# Patient Record
Sex: MALE | Race: WHITE | Employment: FULL TIME | URBAN - METROPOLITAN AREA
[De-identification: names, ages, dates, MRNs, and addresses within clinical notes are randomized per-mention and may not be internally consistent; named-entity substitution may affect disease eponyms.]

---

## 2017-08-21 ENCOUNTER — OFFICE VISIT (OUTPATIENT)
Dept: FAMILY MEDICINE CLINIC | Age: 47
End: 2017-08-21

## 2017-08-21 VITALS
SYSTOLIC BLOOD PRESSURE: 118 MMHG | WEIGHT: 230.6 LBS | TEMPERATURE: 97.5 F | BODY MASS INDEX: 30.56 KG/M2 | HEART RATE: 71 BPM | HEIGHT: 73 IN | OXYGEN SATURATION: 99 % | RESPIRATION RATE: 18 BRPM | DIASTOLIC BLOOD PRESSURE: 86 MMHG

## 2017-08-21 DIAGNOSIS — R20.2 PARESTHESIA OF BOTH HANDS: ICD-10-CM

## 2017-08-21 DIAGNOSIS — Z13.0 SCREENING, ANEMIA, DEFICIENCY, IRON: ICD-10-CM

## 2017-08-21 DIAGNOSIS — Z13.29 SCREENING FOR THYROID DISORDER: ICD-10-CM

## 2017-08-21 DIAGNOSIS — R53.83 FATIGUE, UNSPECIFIED TYPE: ICD-10-CM

## 2017-08-21 DIAGNOSIS — Z13.1 SCREENING FOR DIABETES MELLITUS: ICD-10-CM

## 2017-08-21 DIAGNOSIS — Z13.21 ENCOUNTER FOR VITAMIN DEFICIENCY SCREENING: ICD-10-CM

## 2017-08-21 DIAGNOSIS — Z72.820 POOR SLEEP: ICD-10-CM

## 2017-08-21 DIAGNOSIS — Z13.220 SCREENING, LIPID: ICD-10-CM

## 2017-08-21 DIAGNOSIS — Z00.00 ROUTINE HEALTH MAINTENANCE: ICD-10-CM

## 2017-08-21 DIAGNOSIS — K21.9 GASTROESOPHAGEAL REFLUX DISEASE, ESOPHAGITIS PRESENCE NOT SPECIFIED: ICD-10-CM

## 2017-08-21 DIAGNOSIS — G24.5 BLEPHAROSPASM: ICD-10-CM

## 2017-08-21 DIAGNOSIS — Z00.00 ROUTINE HEALTH MAINTENANCE: Primary | ICD-10-CM

## 2017-08-21 DIAGNOSIS — B35.1 ONYCHOMYCOSIS: ICD-10-CM

## 2017-08-21 RX ORDER — OMEPRAZOLE 40 MG/1
40 CAPSULE, DELAYED RELEASE ORAL DAILY
Qty: 90 CAP | Refills: 3 | Status: SHIPPED | OUTPATIENT
Start: 2017-08-21 | End: 2018-01-05 | Stop reason: SDUPTHER

## 2017-08-21 RX ORDER — CICLOPIROX 80 MG/ML
SOLUTION TOPICAL
Qty: 6.6 ML | Refills: 5 | Status: SHIPPED | OUTPATIENT
Start: 2017-08-21 | End: 2018-01-05 | Stop reason: SDUPTHER

## 2017-08-21 RX ORDER — OMEPRAZOLE 40 MG/1
40 CAPSULE, DELAYED RELEASE ORAL DAILY
COMMUNITY
End: 2017-08-21 | Stop reason: SDUPTHER

## 2017-08-21 NOTE — PROGRESS NOTES
[employer]  Primary Care Office Visit - Complete Physical    Isaac Farias is a 51 y.o. male presenting for annual physical.  : 1970     Assessment/Plan:       ICD-10-CM   1. Routine health maintenance Z00.00   2. Screening for thyroid disorder Z13.29   3. Screening for diabetes mellitus Z13.1   4. Screening, anemia, deficiency, iron Z13.0   5. Encounter for vitamin deficiency screening Z13.21   6. Screening, lipid  Labs ordered accordingly     Z13.220   7. Fatigue, unspecified type - initial encounter R53.83   8. Poor sleep - initial encounter  Will check labs, however with history, likely would benefit from sleep study     Z72.820   9. Blepharospasm, left - initial encounter  Likely 2/2 #7-8, and stress  Reassurance given     G24.5   10. Onychomycosis - initial encounter  penlac     B35.1   11.  Gastroesophageal reflux disease, esophagitis presence not specified - ongoing, followed by GI     K21.9   12. Paresthesia of both hands - initial encounter  Likely carpal tunnel, thus will start with HEP     R20.2       Orders Placed This Encounter    VITAMIN B12 & FOLATE    HEMOGLOBIN A1C WITH EAG     Standing Status:   Future     Number of Occurrences:   1     Standing Expiration Date:   2018    TSH 3RD GENERATION     Standing Status:   Future     Number of Occurrences:   1     Standing Expiration Date:   2018    T4, FREE     Standing Status:   Future     Number of Occurrences:   1     Standing Expiration Date:   2018    VITAMIN D, 25 HYDROXY     Standing Status:   Future     Number of Occurrences:   1     Standing Expiration Date:       METABOLIC PANEL, COMPREHENSIVE     Standing Status:   Future     Number of Occurrences:   1     Standing Expiration Date:   2018    LIPID PANEL     Standing Status:   Future     Number of Occurrences:   1     Standing Expiration Date:   2018    CBC WITH AUTOMATED DIFF     Standing Status:   Future     Number of Occurrences:   1     Standing Expiration Date:   8/22/2018    FERRITIN     Standing Status:   Future     Number of Occurrences:   1     Standing Expiration Date:   8/22/2018    IRON PROFILE     Standing Status:   Future     Number of Occurrences:   1     Standing Expiration Date:   8/22/2018    omeprazole (PRILOSEC) 40 mg capsule     Sig: Take 1 Cap by mouth daily. Dispense:  90 Cap     Refill:  3    ciclopirox (PENLAC) 8 % solution     Sig: Apply to adjacent skin and affected nails daily. Remove with alcohol every 7 days. Dispense:  6.6 mL     Refill:  5       Management plan & patient instructions reviewed with Isaac Cabrera, who voiced understanding. This document may have been created with the aid of dictation software. Text may contain errors, particularly phonetic errors. Stephy Bridges MD  Internal Medicine, Family Medicine & Sports Medicine  8/27/2017, 1:24 PM    Patient Instructions (provided in AVS): To Do:  · return to the office at your convenience for FASTING (nothing to eat/drink 8-10 hours before, except water) bloodwork; lab opens @ 7am M-F      Notes from your doctor:  · I am going to check for some hormonal or vitamin causes of your insomnia and your spasms; if they are normal, I think we should consider sending you to a sleep specialist for a possible sleep study  ·   Tic en el párpado: Instrucciones de cuidado - [ Eyelid Twitch: Care Instructions ]  Síndrome del túnel carpiano: Instrucciones de cuidado - [ Carpal Tunnel Syndrome: Care Instructions ]  Síndrome del túnel carpiano: Ejercicios - [ Carpal Tunnel Syndrome: Exercises ]  Visita de control para personas de 18 a 48 años: Instrucciones de cuidado - [ Well Visit, Ages 25 to 48: Care Instructions ]    History:   Isaac Cabrera is a 51 y.o. male presenting for an annual physical.    Social History     Social History Narrative    Originally from [facility]. 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     Lived in the [address] since 2011.    (8/2017)       Has not been sleeping well.  + left sided eyelid twitch. Wife notes that he snores, and he also has to move his legs a lot. Wakes 3-4x/night to urinate, but possibly just because he is already awake at that time (rather than the need to urinate waking him from sleep). Reports r > l hand paresthesias. Is ambidextrous    Has occasional neck pain  Has completed formal PT in the past for LBP. Has been many years since his last physical.    Past Medical History:   Diagnosis Date    Acid reflux      Past Surgical History:   Procedure Laterality Date    HX HEMORRHOIDECTOMY  2005    HX HERNIA REPAIR Right 1982    HX VASECTOMY        reports that he has been smoking Cigars. He has never used smokeless tobacco. He reports that he drinks alcohol. He reports that he does not use illicit drugs. History   Smoking Status    Current Some Day Smoker    Types: Cigars   Smokeless Tobacco    Never Used     Family History   Problem Relation Age of Onset    Cancer Mother      colon    Cancer Father      prostate     No Known Allergies    Problem List:    There are no active problems to display for this patient. Medications:     Current Outpatient Prescriptions   Medication Sig    omeprazole (PRILOSEC) 40 mg capsule Take 1 Cap by mouth daily. No current facility-administered medications for this visit.         Review of Systems:     (positives in bold)   Constitutional: fatigue, weight change, appetite change, fevers, chills   Eyes: itchy eyes, runny eyes, red eyes, eye discharge, vision changes, light sensitivity   Neuro: headaches, vision changes, dizziness, loss of consciousness, burning pain, tingling, numbness   ENT: ear pain, ear pressure, ear popping, ear discharge/drainage, hearing change,nosebleeds, sneezing, runny nose, nasal congestion,  change in sense of smell, sore throat, voice change or hoarse voice,   dry mouth, toothache, jaw popping, difficulty swallowing, painful swallowing,   oral ulcers or canker sores   Cardiovascular: chest pain, palpitations, orthopnea, PND   Respiratory: dyspnea, wheezing, cough, sputum production, hemoptysis   GI: nausea, vomiting, heartburn, abdominal pain, greasy stools,   blood in stool, diarrhea, constipation   : dysuria, hematuria, change in urine, urinary frequency, urinary urgency   MSK: muscle/joint pain, joint swelling   Derm: rashes, skin changes   Allergy/Imm: seasonal allergies, itchy eyes   Endocrine: Polyuria, polydipsia, polyphagia, heat intolerance, cold intolerance   Heme/lymph: easy bleeding/bruising   Psych: Suicidal ideation, homicidal ideation, insomnia         Physical Assessment:   VS:    Visit Vitals    /86 (BP 1 Location: Left arm, BP Patient Position: Sitting)    Pulse 71    Temp 97.5 °F (36.4 °C) (Oral)    Resp 18    Ht 6' 1\" (1.854 m)    Wt 230 lb 9.6 oz (104.6 kg)    SpO2 99%    BMI 30.42 kg/m2       General:     Well-developed, well-nourished mildly overweight male, in NAD    Head:      PERRL, EOMI.  MMM, good dentition, oropharynx otherwise WNL. No facial asymmetry noted. Ears:    Bilateral TMs wnl. Bilateral EACs wnl. Neck:      Neck supple, no thyromegaly  Cardiovasc:     No JVD. RRR, no MRG. Pulses 2+ and symmetric at distal extremities. Pulmonary:     Lungs clear bilaterally. Normal respiratory effort. Abdomen:     Abdomen soft, NT, ND, NAB. No masses or organomegaly. Extremities:     No edema, no TTP bilateral calves. No joint effusions. LEs warm and well-perfused. Neuro:     Alert and oriented, CNs II-XII intact, no focal deficits. + mildly positive B carpal tunnel tinel  Skin:      + onychomycosis B great toes  Psych:    pleasant, and conversant. Affect, mood & judgment appropriate.

## 2017-08-21 NOTE — MR AVS SNAPSHOT
Visit Information Zuleyma Mendieta Personal Médico Departamento Teléfono del Dep. Número de visita 8/21/2017  1:30 PM Jazzywes TorresWilman [phone] [id number] Follow-up Instructions Return in about 6 months (around 2/21/2018) for 30min follow-up ;Bulgarian. Upcoming Health Maintenance Date Due DTaP/Tdap/Td series (1 - Tdap) 11/28/1990 INFLUENZA AGE 9 TO ADULT 8/1/2017 Alergias  Review Complete El: 8/21/2017 Por: Deedee Torres MD  
 A partir del:  8/21/2017 No Known Allergies Vacunas actuales Arland Jobs No hay ninguna vacuna archivada. No revisadas esta visita You Were Diagnosed With   
  
 [facility] Routine health maintenance    -  Primary ICD-10-CM: Z00.00 ICD-9-CM: V70.0 Onychomycosis     ICD-10-CM: B35.1 ICD-9-CM: 110.1 Gastroesophageal reflux disease, esophagitis presence not specified     ICD-10-CM: K21.9 ICD-9-CM: 530.81 Fatigue, unspecified type     ICD-10-CM: R53.83 ICD-9-CM: 780.79 Blepharospasm     ICD-10-CM: G24.5 ICD-9-CM: 333.81 Screening for thyroid disorder     ICD-10-CM: Z13.29 ICD-9-CM: V77.0 Screening for diabetes mellitus     ICD-10-CM: Z13.1 ICD-9-CM: V77.1 Screening, lipid     ICD-10-CM: Z63.062 ICD-9-CM: V77.91 Screening, anemia, deficiency, iron     ICD-10-CM: Z13.0 ICD-9-CM: V78.0 Encounter for vitamin deficiency screening     ICD-10-CM: Z13.21 ICD-9-CM: V77.99 Paresthesia of both hands     ICD-10-CM: R20.2 ICD-9-CM: 782.0 Partes vitales PS Pulso Temperatura Resp [place] ( percentil de crecimiento) Peso (percentil de crecimiento)  
 118/86 (BP 1 Location: Left arm, BP Patient Position: Sitting) 71 97.5 °F (36.4 °C) (Oral) 18 6' 1\" (1.854 m) 230 lb 9.6 oz (104.6 kg) SpO2 BMI ([facility]) Estatus de tabaquísmo 99% 30.42 kg/m2 Current Some Day Smoker Historial de signos vitales BMI and BSA Data Body Mass Index Body Surface Area  
 30.42 kg/m 2 2.32 m 2 [facility] Phone [facility] [facility] [phone] Chu lista de medicamentos actualizada Lista actualizada el: 8/21/17  2:23 PM.  Warden Homans use chu lista de medicamentos más reciente. ciclopirox 8 % solution También conocido sandra:  Tommye Se Apply to adjacent skin and affected nails daily. Remove with alcohol every 7 days. omeprazole 40 mg capsule También conocido sandra:  [address] Take 1 Cap by mouth daily. Recetas Enviado a la [place] Refills  
 omeprazole (PRILOSEC) 40 mg capsule 3 Sig: Take 1 Cap by mouth daily. Class: Normal  
 Pharmacy: [address] [place] [address] Ph #: [phone] Route: Oral  
 ciclopirox (PENLAC) 8 % solution 5 Sig: Apply to adjacent skin and affected nails daily. Remove with alcohol every 7 days. Class: Normal  
 Pharmacy: [address] [place] [address] Ph #: [phone] Hicimos lo siguiente VITAMIN B12 & FOLATE [75528 CPT(R)] Instrucciones de seguimiento Return in about 6 months (around 2/21/2018) for 30min follow-up ;Luxembourgish. Por hacer 08/21/2017 Lab:  CBC WITH AUTOMATED DIFF   
  
 08/21/2017 Lab:  FERRITIN   
  
 08/21/2017 Lab:  HEMOGLOBIN A1C WITH EAG   
  
 08/21/2017 Lab:  IRON PROFILE   
  
 08/21/2017 Lab:  LIPID PANEL   
  
 08/21/2017 Lab:  METABOLIC PANEL, COMPREHENSIVE   
  
 08/21/2017 Lab:  T4, FREE   
  
 08/21/2017 Lab:  TSH 3RD GENERATION   
  
 08/21/2017 Lab:  VITAMIN D, 25 HYDROXY Instrucciones para el Paciente To Do: 
· return to the office at your convenience for FASTING (nothing to eat/drink 8-10 hours before, except water) bloodwork; lab opens @ 7am M-F Notes from your doctor: · I am going to check for some hormonal or vitamin causes of your insomnia and your spasms; if they are normal, I think we should consider sending you to a sleep specialist for a possible sleep study Tic en el párpado: Instrucciones de cuidado - [ Eyelid Twitch: Care Instructions ] Instrucciones de cuidado Un tic en el párpado (blefaroespasmo) es un espasmo muscular en el párpado que no se puede controlar. En algunos casos, el párpado se naomi (o no llega a cerrarse) y luego se vuelve a abrir. Podría tener problemas con zahraa o ambos ojos. Además, podría ser sensible a la barry intensa. Los músculos de los párpados podrían tener raj contracción debido a fatiga o estrés. Dion bebidas con cafeína también puede causar tics en el párpado. Estas contracciones podrían molestarlo de manera intermitente marv varios días. Dary tipo de tic en el párpado es común y podría ser El dorado. A menudo, el tic en el párpado desaparece mientras duerme y comienza nuevamente al despertar. La mayoría de las personas ni siquiera notan cuando la contracción se detiene. Es posible que ferreira médico no pueda encontrar la causa de ferreira tic en los párpados. Si los tics son muy intensos, podría pensar en recibir inyecciones de toxina botulínica (Botox). La atención de seguimiento es raj parte clave de ferreira tratamiento y seguridad. Asegúrese de hacer y acudir a todas las citas, y llame a ferreira médico si está teniendo problemas. También es raj buena idea saber los resultados de los exámenes y mantener raj lista de los medicamentos que brigitte. Cómo puede cuidarse en el hogar? · PPS, ya que podría ayudarle a [employer] tics en el párpado. · Consuma menos cafeína. Ésta puede causar espasmos musculares en los ojos. · Utilice gotas para los ojos para mantener la [employer] ojos. Cuándo debe pedir ayuda?  
Preste especial atención a los cambios en ferreira garima y asegúrese de comunicarse con ferreira médico si: 
 · La contracción espasmódica de los párpados dura más de 1 semana. · Comienza a tener contracciones en otras partes de la drew. · Tiene enrojecimiento o hinchazón del dinora. · El dinora supura líquidos. · El párpado se naomi por completo. · No mejora sandra se esperaba. Dónde puede encontrar más información en inglés? Sharlene Greer a [email or web address] Ashlyn Gaytan R820 en la búsqueda para aprender más acerca de \"Tic en el párpado: Instrucciones de cuidado - [ Eyelid Twitch: Care Instructions ]. \" 
Revisado: 20 marzo, 2017 Versión del contenido: 11.3 © 6530-8388 Healthwise, Incorporated. Las instrucciones de cuidado fueron adaptadas bajo licencia por Good Help Connections (which disclaims liability or warranty for this information). Si usted tiene [place] afección médica o sobre estas instrucciones, siempre pregunte a ferreira profesional de garima. Healthwise, Incorporated niega toda garantía o responsabilidad por ferreira uso de esta información. Síndrome del túnel carpiano: Instrucciones de cuidado - [ Carpal Tunnel Syndrome: Care Instructions ] Instrucciones de cuidado El síndrome del túnel carpiano es un problema nervioso. Puede causar hormigueo, entumecimiento, debilidad o [employer] dedos, el pulgar y la [place]. El nervio mediano y varios tejidos fibrosos, llamados tendones, atraviesan la neymar por un espacio denominado túnel carpiano. El movimiento repetido de las louis al trabajar o practicar ciertos pasatiempos y deportes puede hacer presión sobre el nervio. El embarazo y ciertas afecciones médicas, sandra la diabetes, la artritis y [employer] tiroides hipoactiva, también pueden causar el síndrome del [facility]. Para reducir los síntomas, usted podría limitar [facility] o [facility]. También puede marly otras medidas para sentirse mejor.  Si los síntomas son leves, es probable que pueda aliviar el dolor con 1 o 2 semanas de tratamiento en el [facility]. La cirugía es necesaria solo si otros tratamientos no funcionan. La atención de seguimiento es raj parte clave de ferreira tratamiento y seguridad. Asegúrese de hacer y acudir a todas las citas, y llame a ferreira médico si está teniendo problemas. También es raj buena idea saber los resultados de rosalee exámenes y mantener raj lista de los medicamentos que brigitte. Cómo puede cuidarse en el hogar? · Si es posible, interrumpa o disminuya la actividad que causa los síntomas. Si no puede suspenderla, cristy pausas frecuentes para descansar y estirarse o cambie la posición de las louis para hacer raj tarea. Trate de [facility], sandra cuando [place] el ratón de raj computadora. · Trate de evitar doblar o rotar las muñecas. · Pregúntele a ferreira médico si puede marly un analgésico (medicamento para el dolor) de venta jacquie, sandra acetaminofén (Tylenol), ibuprofeno (Advil, Motrin) o naproxeno (Aleve). Sea akila con los medicamentos. Jennifer y siga todas las instrucciones de la [place]. · Si ferreira médico le receta corticosteroides para ayudar a aliviar el dolor y reducir la hinchazón, tómelos exactamente sandra le fueron recetados. Llame a ferreira médico si ran estar teniendo problemas con ferreira medicamento. · Colóquese hielo o raj compresa fría sobre la [place] de 10 a 20 minutos cada vez para aliviar el dolor. Póngase un paño roldan entre el hielo y la piel. · Si ferreira médico o ferreira fisioterapeuta o terapeuta ocupacional le indica que use raj tablilla (férula) para la [place], [place] según las indicaciones para mantener la [place] en raj posición neutra. [place] también reduce la presión sobre ferreira nervio mediano. · Pregúntele a ferreira médico si debería hacer sesiones de fisioterapia o de terapia ocupacional para aprender a hacer las tareas de [employer]. · Mosby clases de yoga para estirar los músculos y fortalecer las louis y las [place]. El yoga ha [employer] síntomas del bryan [facility]. Cómo prevenir el síndrome del túnel carpiano · Cuando trabaje en la computadora, [employer] y las muñecas alineadas con los antebrazos. Mantenga los codos cerca de rosalee costados. Richton un descanso con raj frecuencia de 10 a 15 minutos. · Trate de hacer los siguientes ejercicios: 
¨ Calentamiento: Gire la neymar hacia arriba, [place] y de un lado a otro. Repita esto 4 veces. Estire [employer] dedos, relájelos y vuelva a estirarlos. Repita 4 veces. Estire el pulgar doblándolo levemente hacia atrás, manténgalo en estelle posición y relájelo. Repita 4 veces. ¨ Estiramiento con los [employer] en posición de orar: Comience colocando las gabriella de las louis juntas barbara del pecho, nahid debajo del [place] falls. Baje las louis lentamente hacia la línea de la cintura y manténgalas cerca del estómago con las gabriella juntas hasta que sienta un estiramiento leve a moderado debajo de los antebrazos. Mantenga la posición entre 10 y 21 segundos. Repita 4 veces. ¨ Estiramiento del flexor de la neymar: Extienda el brazo frente a usted, con la lind [place] arriba. Doble la neymar y apunte con la mano hacia el piso. Con la [employer], doble con suavidad la neymar aún más hasta que sienta un estiramiento entre leve y moderado en el antebrazo. Mantenga la posición entre 10 y 21 segundos. Repita 4 veces. ¨ Estiramiento del extensor de la neymar: Repita los pasos para el estiramiento del flexor de la neymar roberto comience con la lind extendida [place]. · Apriete raj pelota de goma varias veces al día para mantener ashley las louis y los dedos. · Evite sostener objetos (sandra un libro) en la misma posición marv mucho tiempo. Siempre que sea posible, utilice toda la mano para marly un objeto. Si [place] solo el pulgar y el dedo índice puede tensionar la [place]. · No fume. Puede empeorar esta afección ya que reduce el flujo de maynor hacia el nervio El paso.  Si necesita ayuda para dejar de fumar, hable con ferreira médico sobre programas y medicamentos para dejar de fumar. Estos pueden aumentar rosalee probabilidades de dejar el hábito para siempre. Cuándo debe pedir ayuda? Preste especial atención a los cambios de ferreira garima y asegúrese de comunicarse con ferreira médico si: 
· El dolor u otros problemas no mejoran con los cuidados en el hogar. · Desea más información sobre la fisioterapia o la terapia ocupacional. 
· Tiene efectos secundarios producidos por los corticosteroides, tales sandra: ¨ Aumento de [place]. ¨ Cambios en el estado de ánimo. ¨ Problemas para dormir. ¨ Fácil formación de moretones. · Tiene otros problemas con los medicamentos. Dónde puede encontrar más información en inglés? Margarito Comer a [email or web address] Zahida Spotted R432 en la búsqueda para aprender más acerca de \"Síndrome del túnel candice: Instrucciones de cuidado - [ Carpal Tunnel Syndrome: Care Instructions ]. \" 
Revisado: 21 marzo, 2017 Versión del contenido: 11.3 © 1082-0767 Healthwise, Incorporated. Las instrucciones de cuidado fueron adaptadas bajo licencia por Good Help Connections (which disclaims liability or warranty for this information). Si usted tiene [place] afección médica o sobre estas instrucciones, siempre pregunte a ferreira profesional de garima. Healthwise, Incorporated niega toda garantía o responsabilidad por ferreira uso de esta información. Síndrome del túnel candice: Ejercicios - [ Carpal Tunnel Syndrome: Exercises ] Instrucciones de cuidado Aquí se presentan algunos ejemplos de ejercicios típicos de rehabilitación para tratar ferreira afección. Empiece cada ejercicio lentamente. Reduzca la intensidad del ejercicio si Canary Manohar a tener dolor. Ferreira médico o ferreira fisioterapeuta o terapeuta ocupacional le dirá cuándo puede comenzar con estos ejercicios y cuáles funcionarán mejor para usted.  
Cómo hacer los ejercicios  
Nota: Cuando ya no tenga dolor o entumecimiento, puede hacer ejercicios para ayudar a prevenir que vuelva el síndrome del túnel carpiano. No cristy ningún estiramiento o movimiento que sea incómodo o doloroso. Estiramientos de calentamiento · Gire la neymar hacia arriba, Friendly abajo y de un lado a otro. Repita 4 veces. · Estire los dedos separándolos. Relájelos y luego vuelva a estirarlos. Repita 4 veces. · Estire el pulgar doblándolo levemente hacia atrás, manténgalo en estelle posición y relájelo. Repita 4 veces. Estiramiento con los [employer] posición de orar 1. Comience colocando las gabriella de las louis juntas barbara del [place], nahid debajo del [place]. 2. Baje las louis lentamente hacia la línea de la cintura y manténgalas cerca del estómago con las gabriella juntas hasta que sienta un estiramiento leve a moderado debajo de los antebrazos. 3. Sosténgalo por lo menos de 15 a 30 segundos. Repita de 2 a 4 veces. Estiramiento del flexor de la [employer] 2. Extienda el brazo barbara de usted con la lind Friendly arriba. 3. Doble la neymar y apunte con la mano hacia el piso. 4. Con la otra mano, doble con suavidad la neymar aún más hasta que sienta un estiramiento entre leve y moderado en el antebrazo. 5. Sosténgalo por lo menos de 15 a 30 segundos. Repita de 2 a 4 veces. Estiramiento del extensor de la [employer] Repita los pasos del 1 al 4 del estiramiento anterior, roberto comience con la lind de la mano extendida Kjazmin K. La atención de seguimiento es raj parte clave de ferreira tratamiento y seguridad. Asegúrese de hacer y acudir a todas las citas, y llame a ferreira médico si está teniendo problemas. También es raj buena idea saber los resultados de rosalee exámenes y mantener raj lista de los medicamentos que brigitte. Dónde puede encontrar más información en inglés? Ismael Veliz a [email or web address] Silverio Bergeron C759 en la búsqueda para aprender más acerca de \"Síndrome del túnel carpiano: Ejercicios - [ Carpal Tunnel Syndrome: Exercises ]. \" 
Revisado: 21 marzo, 2017 Versión del contenido: 11.3 © 4758-2410 Healthwise, Incorporated. Las instrucciones de cuidado fueron adaptadas bajo licencia por Good Help Connections (which disclaims liability or warranty for this information). Si fernie tiene [place] afección médica o sobre estas instrucciones, siempre pregunte a ferreira profesional de garima. Healthwise, Incorporated niega toda garantía o responsabilidad por ferreira uso de esta información. Visita de control para personas de 18 a 50 años: Instrucciones de cuidado - [ Well Visit, Ages 25 to 48: Care Instructions ] Instrucciones de cuidado Los exámenes físicos pueden ayudarle a mantenerse saludable. Ferreira médico lenz revisado ferreira estado general de garima y podría haberle dado algunos consejos para cuidarse. También podría haberle recomendado otros exámenes. En ferreira hogar, fernie puede ayudar a prevenir enfermedades si come de manera saludable, hace ejercicio con regularidad y sigue otras recomendaciones. La atención de seguimiento es raj parte clave de ferreira tratamiento y seguridad. Asegúrese de hacer y acudir a todas las citas, y llame a ferreira médico si está teniendo problemas. También es raj buena idea saber los resultados de rosalee exámenes y mantener raj lista de los medicamentos que brigitte. Cómo puede cuidarse en el hogar? · Alcance un peso saludable y [place]. [place] disminuirá el riesgo de tener [employer], tales sandra obesidad, diabetes, enfermedad cardíaca y presión arterial firo. · Wendy actividad física por lo menos 30 minutos la mayoría de los días de la [place]. Caminar es raj buena opción. Rosaura Erazo desee hacer otras actividades, sandra correr, nadar, [employer], o jugar al tenis u otros deportes de equipo. Discuta con ferreira médico cualquier cambio que quiera introducir en ferreira programa de ejercicios. · No fume ni permita que otros fumen cerca de usted.  Si necesita ayuda para dejar de fumar, hable con ferreira médico sobre programas y [employer] para dejar de fumar. Pueden aumentar rosalee probabilidades de dejar el hábito para siempre. · Consulte con ferreira médico si presenta factores de riesgo de infecciones de transmisión sexual (STI, por rosalee siglas en inglés). Tener raj msiha romel sexual (que no tenga STI y que no tenga relaciones sexuales con nadie más) es raj buena manera de evitar estas infecciones. · Utilice métodos anticonceptivos si no desea tener hijos en silvestre momento. Consulte con ferreira médico acerca de las opciones disponibles más adecuadas para usted. · Protéjase la piel del exceso de sol. [address] 10 a.m. y las 4 p.m., permanezca a la tram o [place] con prendas de vestir y un sombrero de ala ancha. Use gafas de sol que bloqueen los isis ultravioleta. Póngase un protector solar de amplio espectro (SPF 30 o superior) en la piel expuesta, incluso cuando esté nublado. · Acuda al dentista FATEMEH [facility] veces al año para hacerse chequeos y limpiezas dentales. · En el automóvil, use el cinturón de seguridad. · Katheryn alcohol en forma moderada, o no katheryn nada. [place] significa no más de 2 bebidas al día si es hombre, y no más de 1 al día si es Johnathan. Siga las recomendaciones de ferreira médico acerca de cuándo hacerse determinados exámenes. Estas pruebas pueden detectar problemas a tiempo. Para ambos sexos · Colesterol. Hágase un análisis de la grasa (colesterol) en la maynor después de los 21 años de Bertrand. Ferreira médico le indicará con qué frecuencia debe [employer] análisis según ferreira edad, rosalee antecedentes familiares u otros factores que pueden aumentar el riesgo de enfermedad cardíaca. · Presión arterial. Hágase marly la presión arterial marv raj visita de rutina al médico. Ferreira médico le dirá con qué frecuencia debe revisarse la presión arterial según ferreira edad, rosalee niveles de presión arterial y otros factores. · Visión. Hable con ferreira médico acerca de la frecuencia con que debe hacerse raj prueba de glaucoma. · Diabetes. Pregúntele a chu médico si debería hacerse pruebas para la diabetes. · Cáncer de colon. Hágase raj prueba para el cáncer de colon a los 48 años. Usted podría hacerse raj de las [address] Si tiene menos de [address], podría necesitar hacerse raj prueba antes si tiene factores de [place]. Los factores de riesgo incluyen si ya le santiago extraído un pólipo precanceroso del colon o si alguno de rosalee padres, hermanos o hijos lenz tenido cáncer de colon. [address] · El examen de senos y la [place]. Pregúntele a chu médico cuándo debe hacerse un examen clínico de los senos (mamas) y Alonzo. Los expertos médicos no están de acuerdo en si raj hugh de menos de 48 años de edad debe o no hacerse estos exámenes o con qué frecuencia. Chu médico puede ayudarle a decidir qué es lo adecuado para usted. · La prueba de Papanicolaou y el examen Carrol Sami a hacerse pruebas de Papanicolaou a los 21 años. El Papanicolaou es la mejor manera de detectar el cáncer de sid uterino. Esta prueba suele ser parte del examen pélvico. Pregunte con qué frecuencia debe hacerse esta prueba. · Infecciones de transmisión sexual (STI, por rosalee siglas en inglés). Consulte si debe hacerse pruebas de detección de STI. Puede correr riesgo si tiene [employer] con más de [place] persona, especialmente si rosalee parejas no utilizan condones. Para los hombres · Infecciones de transmisión sexual (STI). Consulte si debe hacerse pruebas de detección de STI. Puede correr riesgo si tiene [employer] con más de [place]rella persona, especialmente si usted no utiliza condón. · Examen para el cáncer testicular. Pregúntele a chu médico si debería hacerse un examen de testículos con regularidad. · Examen de próstata. Hable con chu médico para saber si debe hacerse un análisis de maynor para el cáncer de próstata (prueba del PSA, por rosalee siglas en inglés).  Los expertos difieren en cuanto a si los hombres deben hacerse esta prueba y con qué frecuencia. Algunos especialistas lo recomiendan a las personas mayores de 39 años de origen afroamericano o que tienen un padre o un kay que tuvo cáncer de próstata antes de los 65 [place] mar[place]. Cuándo debe pedir ayuda? Preste especial atención a los cambios en ferreira garima y asegúrese de comunicarse con ferreira médico si tiene problemas o síntomas que le preocupan. Dónde puede encontrar más información en inglés? Valeria Reinoso a [email or web address] Escriba P072 en la búsqueda para aprender más acerca de \"Visita de control para personas de 18 a 50 años: Instrucciones de cuidado - [ Well Visit, Ages 25 to 48: Care Instructions ]. \" 
Revisado: 19 julio, 2016 Versión del contenido: 11.3 © 2004-6928 Healthwise, Incorporated. Las instrucciones de cuidado fueron adaptadas bajo licencia por Good Luminous [employer] Connections (which disclaims liability or warranty for this information). Si usted tiene [place] afección médica o sobre estas instrucciones, siempre pregunte a ferreira profesional de garima. Healthwise, Incorporated niega toda garantía o responsabilidad por ferreira uso de esta información. Introducing [facility]! [facility] introduce portal paciente Olegario . Ahora se puede acceder a partes de ferreira expediente médico, enviar por correo electrónico la oficina de ferreira médico y solicitar renovaciones de medicamentos en línea. En ferreira navegador de Internet , Niranjan Marcos a [email or web address] Wendy clic en el usuario por Margarite [place]? Baldo Mccarty clic aquí en la sesión Kaity Due. Verá la página de registro [place]. Ingrese ferreira código de [facility] ceci y asndra aparece a continuación. Usted no tendrá que [employer] código después de alayna completado el proceso de registro . Si usted no se inscribe antes de la fecha de caducidad , debe solicitar un nuevo código. · MyChart Código de acceso : [id number] Expires: 11/19/2017  2:23 PM 
 
 Ingresa los últimos cuatro dígitos de ferreira Número de Seguro Social ( xxxx ) y fecha de nacimiento ( dd / mm / aaaa ) sandra se indica y wendy clic en Enviar. Usted será llevado a la siguiente página de registro . Crear un ID MyChart . Esta será ferreira ID de inicio de sesión de MyChart y no puede ser [place] , por lo que pensar en raj que es Pearletha Senters y fácil de recordar . Crear raj contraseña MyChart . Usted puede cambiar ferreira contraseña en cualquier momento . Ingrese ferreira Password Reset de preguntas y Alonzo . Descanso se puede utilizar en un momento posterior si usted olvida ferreira contraseña. Introduzca ferreira dirección de correo electrónico . Floral Fass recibirá raj notificación por correo electrónico cuando la nueva información está disponible en MyChart . Kerri Pineda clic en Registrarse. [facility] ruiz y descargar porciones de ferreira expediente médico. 
Wendy clic en el enlace de descarga del menú Resumen para descargar raj copia portátil de ferreira información médica . Si tiene [employer] , por favor visite la sección de preguntas frecuentes del sitio web MyChart . Recuerde, MyChart NO es que se utilizará para las necesidades urgentes. Para emergencias médicas , llame al 911 . Ahora disponible en ferreira iPhone y Android ! Por favor proporcione silvestre resumen de la documentación de cuidado a ferreira próximo proveedor. If you have any questions after today's visit, please call [phone].

## 2017-08-21 NOTE — PROGRESS NOTES
Isaac Odom is a 51 y.o. male (: 1970) presenting to address:    Chief Complaint   Patient presents with    Establish Care    Eye Problem     left twitching     Fatigue       Vitals:    17 1308   BP: 118/86   Pulse: 71   Resp: 18   Temp: 97.5 °F (36.4 °C)   TempSrc: Oral   SpO2: 99%   Weight: 230 lb 9.6 oz (104.6 kg)   Height: 6' 1\" (1.854 m)   PainSc:   0 - No pain       Learning Assessment:     Learning Assessment 2017   PRIMARY LEARNER Patient   HIGHEST LEVEL OF EDUCATION - PRIMARY LEARNER  > 4 YEARS OF COLLEGE   BARRIERS PRIMARY LEARNER LANGUAGE   CO-LEARNER CAREGIVER Yes   CO-LEARNER NAME wife   CO-LEARNER HIGHEST LEVEL OF EDUCATION > 4 YEARS OF COLLEGE   BARRIERS CO-LEARNER LANGUAGE   PRIMARY LANGUAGE Uruguayan   PRIMARY LANGUAGE CO-LEARNER Uruguayan    NEED No   LEARNER PREFERENCE PRIMARY READING   LEARNER PREFERENCE CO-LEARNER VIDEOS   LEARNING SPECIAL TOPICS no   ANSWERED BY self   RELATIONSHIP SELF     Depression Screening:     PHQ over the last two weeks 2017   Little interest or pleasure in doing things Not at all   Feeling down, depressed or hopeless Not at all   Total Score PHQ 2 0     Fall Risk Assessment:     Fall Risk Assessment, last 12 mths 2017   Able to walk? Yes   Fall in past 12 months? No     Abuse Screening:     Abuse Screening Questionnaire 2017   Do you ever feel afraid of your partner? N   Are you in a relationship with someone who physically or mentally threatens you? N   Is it safe for you to go home? Y     Coordination of Care Questionaire:   1. Have you been to the ER, urgent care clinic since your last visit? Hospitalized since your last visit? NO    2. Have you seen or consulted any other health care providers outside of the [address] [place] [address] since your last visit? Include any pap smears or colon screening. NO    Advanced Directive:   1. Do you have an Advanced Directive? NO    2.  Would you like information on Advanced Directives?  NO

## 2017-08-21 NOTE — PATIENT INSTRUCTIONS
To Do:  · return to the office at your convenience for FASTING (nothing to eat/drink 8-10 hours before, except water) bloodwork; lab opens @ 7am M-F      Notes from your doctor:  · I am going to check for some hormonal or vitamin causes of your insomnia and your spasms; if they are normal, I think we should consider sending you to a sleep specialist for a possible sleep study         Tic en el párpado: Instrucciones de cuidado - [ Eyelid Twitch: Care Instructions ]  Instrucciones de cuidado  Un tic en el párpado (blefaroespasmo) es un espasmo muscular en el párpado que no se puede controlar. En algunos casos, el párpado se naomi (o no llega a cerrarse) y luego se vuelve a abrir. Podría tener problemas con zahraa o ambos ojos. Además, podría ser sensible a la barry intensa. Los músculos de los párpados podrían tener raj contracción debido a fatiga o estrés. Dion bebidas con cafeína también puede causar tics en el párpado. Estas contracciones podrían molestarlo de manera intermitente marv varios días. Dary tipo de tic en el párpado es común y podría ser El dorado. A menudo, el tic en el párpado desaparece mientras duerme y comienza nuevamente al despertar. La mayoría de las personas ni siquiera notan cuando la contracción se detiene. Es posible que ferreira médico no pueda encontrar la causa de ferreira tic en los párpados. Si los tics son muy intensos, podría pensar en recibir inyecciones de toxina botulínica (Botox). La atención de seguimiento es raj parte clave de ferreira tratamiento y seguridad. Asegúrese de hacer y acudir a todas las citas, y llame a ferreira médico si está teniendo problemas. También es raj buena idea saber los resultados de los exámenes y mantener raj lista de los medicamentos que brigitte. Cómo puede cuidarse en el hogar? · [employer], ya que podría ayudarle a [employer] tics en el párpado. · Consuma menos cafeína. Ésta puede causar espasmos musculares en los ojos.   · Utilice gotas para los ojos para [employer] la [employer] ojos. Cuándo debe pedir ayuda? Preste especial atención a los cambios en ferreira garima y asegúrese de comunicarse con ferreira médico si:  · La contracción espasmódica de los párpados dura más de 1 semana. · Comienza a tener contracciones en otras partes de la drew. · Tiene enrojecimiento o hinchazón del dinora. · El dinora supura líquidos. · El párpado se naomi por completo. · No mejora sandra se esperaba. Dónde puede encontrar más información en inglés? Tian Francis a http://sumi-ellyn.info/. Wilson March F550 en la búsqueda para aprender más acerca de \"Tic en el párpado: Instrucciones de cuidado - [ Eyelid Twitch: Care Instructions ]. \"  Revisado: 20 marzo, 2017  Versión del contenido: 11.3  © 6071-7431 Healthwise, Incorporated. Las instrucciones de cuidado fueron adaptadas bajo licencia por Good Interview Master Connections (which disclaims liability or warranty for this information). Si usted tiene [place] afección médica o sobre estas instrucciones, siempre pregunte a ferreira profesional de garima. Healthwise, Incorporated niega toda garantía o responsabilidad por efrreira uso de esta información. Síndrome del túnel carpiano: Instrucciones de cuidado - [ Carpal Tunnel Syndrome: Care Instructions ]  Instrucciones de cuidado    El síndrome del túnel carpiano es un problema nervioso. Puede causar hormigueo, entumecimiento, debilidad o [employer] dedos, el pulgar y la [place]. El nervio mediano y varios tejidos fibrosos, llamados tendones, atraviesan la neymar por un espacio denominado túnel carpiano. El movimiento repetido de las lousi al trabajar o practicar ciertos pasatiempos y deportes puede hacer presión sobre el nervio. El embarazo y ciertas afecciones médicas, sandra la diabetes, la artritis y Rhonda Miners tiroides hipoactiva, también pueden causar el síndrome del túnel carpiano. Para reducir los síntomas, usted podría limitar [facility] o [facility] Centerla de [place].  También puede marly [employer] para sentirse mejor. Si los síntomas son leves, es probable que pueda aliviar el dolor con 1 o 2 semanas de tratamiento en el hogar. La cirugía es necesaria solo si otros tratamientos no funcionan. La atención de seguimiento es raj parte clave de ferreira tratamiento y seguridad. Asegúrese de hacer y acudir a todas las citas, y llame a ferreira médico si está teniendo problemas. También es raj buena idea saber los resultados de rosalee exámenes y mantener raj lista de los medicamentos que brigitte. Cómo puede cuidarse en el hogar? · Si es posible, interrumpa o disminuya la actividad que causa los síntomas. Si no puede suspenderla, cristy pausas frecuentes para descansar y estirarse o cambie la posición de las louis para hacer raj tarea. Trate de [facility], sandra cuando [place] el ratón de raj computadora. · Trate de evitar doblar o rotar las muñecas. · Pregúntele a ferreira médico si puede marly un analgésico (medicamento para el dolor) de venta jacquie, sandra acetaminofén (Tylenol), ibuprofeno (Advil, Motrin) o naproxeno (Aleve). Sea akila con los medicamentos. Jennifer y siga todas las instrucciones de la [place]. · Si ferreira médico le receta corticosteroides para ayudar a aliviar el dolor y reducir la hinchazón, tómelos exactamente sandra le fueron recetados. Llame a ferreira médico si ran estar teniendo problemas con ferreira medicamento. · Colóquese hielo o raj compresa fría sobre la [place] de 10 a 20 minutos cada vez para aliviar el dolor. Póngase un paño roldan entre el hielo y la piel. · Si ferreira médico o ferreira fisioterapeuta o terapeuta ocupacional le indica que use raj tablilla (férula) para la [place], [place] según las indicaciones para mantener la [place] en raj posición neutra. [place] también reduce la presión sobre ferreira nervio mediano. · Pregúntele a ferreira médico si debería hacer sesiones de fisioterapia o de terapia ocupacional para aprender a hacer las tareas de [employer].   · [place] clases de yoga para estirar los músculos y fortalecer las louis y las muñecas. El yoga ha [employer] síntomas del bryan harvey. Cómo prevenir el síndrome del bryan harvey  · Cuando trabaje en la computadora, Juany Priest 31 y las muñecas alineadas con los antebrazos. Mantenga los codos cerca de rosalee costados. [place] un descanso con raj frecuencia de 10 a 15 minutos. · Trate de hacer los siguientes ejercicios:  ¨ Calentamiento: Gire la neymar hacia arriba, [employer] y de un lado a otro. Repita esto 4 veces. Estire [employer] dedos, relájelos y vuelva a estirarlos. Repita 4 veces. Estire el pulgar doblándolo levemente hacia atrás, manténgalo en estelle posición y relájelo. Repita 4 veces. ¨ Estiramiento con los [facility] en posición de orar: Comience colocando las gabriella de las louis juntas barbara del pecho, nahid debajo del [place]. Baje las louis lentamente hacia la línea de la cintura y manténgalas cerca del estómago con las gabriella juntas hasta que sienta un estiramiento leve a moderado debajo de los antebrazos. Mantenga la posición entre 10 y 21 segundos. Repita 4 veces. ¨ Estiramiento del flexor de la neymar: Extienda el brazo frente a usted, con la lind [place] arriba. Doble la neymar y apunte con la mano hacia el piso. Con la [employer], doble con suavidad la neymar aún más hasta que sienta un estiramiento entre leve y moderado en el antebrazo. Mantenga la posición entre 10 y 21 segundos. Repita 4 veces. ¨ Estiramiento del extensor de la neymar: Repita los pasos para el estiramiento del flexor de la neymar roberto comience con la lind extendida [employer]. · Apriete raj pelota de goma varias veces al día para mantener ashley las louis y los dedos. · Evite sostener objetos (sandra un libro) en la misma posición marv mucho tiempo. Siempre que sea posible, utilice toda la mano para marly un objeto. Si [place] solo el pulgar y el dedo índice puede tensionar la [place]. · No fume. Puede empeorar esta afección ya que reduce el flujo de maynor hacia el nervio El paso.  Si necesita ayuda para dejar de fumar, hable con ferreira médico sobre programas y medicamentos para dejar de fumar. Estos pueden aumentar rosalee probabilidades de dejar el hábito para siempre. Cuándo debe pedir ayuda? Preste especial atención a los cambios de ferreira garima y asegúrese de comunicarse con ferreira médico si:  · El dolor u otros problemas no mejoran con los cuidados en el hogar. · Desea más información sobre la fisioterapia o la terapia ocupacional.  · Tiene efectos secundarios producidos por los corticosteroides, tales sandra:  ¨ Aumento de [place]. ¨ Cambios en el estado de ánimo. ¨ Problemas para dormir. ¨ Fácil formación de moretones. · Tiene otros problemas con los medicamentos. Dónde puede encontrar más información en inglés? [place] [employer] a http://sumi-ellyn.info/. Elinda Valle R432 en la búsqueda para aprender más acerca de \"Síndrome del túnel carpiano: Instrucciones de cuidado - [ Carpal Tunnel Syndrome: Care Instructions ]. \"  Revisado: 21 marzo, 2017  Versión del contenido: 11.3  © 5068-6639 Healthwise, Incorporated. Las instrucciones de cuidado fueron adaptadas bajo licencia por Good Help Connections (which disclaims liability or warranty for this information). Si usted tiene [place] afección médica o sobre estas instrucciones, siempre pregunte a ferreira profesional de garima. Healthwise, Incorporated niega toda garantía o responsabilidad por ferreira uso de esta información. Síndrome del túnel carpiano: Ejercicios - [ Carpal Tunnel Syndrome: Exercises ]  Instrucciones de cuidado  Aquí se presentan algunos ejemplos de ejercicios típicos de rehabilitación para tratar ferreira afección. Empiece cada ejercicio lentamente. Reduzca la intensidad del ejercicio si Annelle Marrow a tener dolor. Ferreira médico o ferreira fisioterapeuta o terapeuta ocupacional le dirá cuándo puede comenzar con estos ejercicios y cuáles funcionarán mejor para usted.   Cómo hacer los ejercicios   Nota: Cuando ya no tenga dolor o entumecimiento, puede hacer ejercicios para ayudar a prevenir que vuelva el síndrome del túnel carpiano. No cristy ningún estiramiento o movimiento que sea incómodo o doloroso. Estiramientos de calentamiento  · Gire la neymar hacia arriba, Simone Judy y de un lado a otro. Repita 4 veces. · Estire los dedos separándolos. Relájelos y luego vuelva a estirarlos. Repita 4 veces. · Estire el pulgar doblándolo levemente hacia atrás, manténgalo en estelle posición y relájelo. Repita 4 veces. Estiramiento con los brazos en posición de orar    1. Comience colocando las gabriella de las louis juntas barbara del [place], nahid debajo del [place]. 2. Baje las louis lentamente hacia la línea de la cintura y manténgalas cerca del estómago con las gabriella juntas hasta que sienta un estiramiento leve a moderado debajo de los antebrazos. 3. Sosténgalo por lo menos de 15 a 30 segundos. Repita de 2 a 4 veces. Estiramiento del flexor de la neymar    2. Extienda el brazo barbara de usted con la lind [place] arriba. 3. Doble la neymar y apunte con la mano hacia el piso. 4. Con la otra mano, doble con suavidad la neymar aún más hasta que sienta un estiramiento entre leve y moderado en el antebrazo. 5. Sosténgalo por lo menos de 15 a 30 segundos. Repita de 2 a 4 veces. Estiramiento del extensor de la neymar    Repita los pasos del 1 al 4 del estiramiento anterior, roberto comience con la lind de la mano extendida Simone Judy. La atención de seguimiento es raj parte clave de ferreira tratamiento y seguridad. Asegúrese de hacer y acudir a todas las citas, y llame a ferreira médico si está teniendo problemas. También es raj buena idea saber los resultados de rosalee exámenes y mantener raj lista de los medicamentos que brigitte. Dónde puede encontrar más información en inglés? Lorena Prather a http://sumi-ellyn.info/. Abby Alvarez S927 en la búsqueda para aprender más acerca de \"Síndrome del túnel carpiano: Ejercicios - [ Carpal Tunnel Syndrome: Exercises ]. \"  Monique Galdamez: 21 marzo, 2017  Versión del contenido: 11.3  © 0945-0573 Healthwise, Incorporated. Las instrucciones de cuidado fueron adaptadas bajo licencia por Good Help Connections (which disclaims liability or warranty for this information). Si fernie tiene [place] afección médica o sobre estas instrucciones, siempre pregunte a ferreira profesional de garima. Healthwise, Incorporated niega toda garantía o responsabilidad por ferreira uso de esta información. Visita de control para personas de 18 a 50 años: Instrucciones de cuidado - [ Well Visit, Ages 25 to 48: Care Instructions ]  Instrucciones de cuidado  Los exámenes físicos pueden ayudarle a mantenerse saludable. Ferreira médico lenz revisado ferreira estado general de garima y podría haberle dado algunos consejos para cuidarse. También podría haberle recomendado otros exámenes. En ferreira hogar, fernie puede ayudar a prevenir enfermedades si come de manera saludable, hace ejercicio con regularidad y sigue otras recomendaciones. La atención de seguimiento es raj parte clave de ferreira tratamiento y seguridad. Asegúrese de hacer y acudir a todas las citas, y llame a ferreira médico si está teniendo problemas. También es raj buena idea saber los resultados de rosalee exámenes y mantener raj lista de los medicamentos que brigitte. Cómo puede cuidarse en el hogar? · Alcance un peso saludable y [place]. [place] disminuirá el riesgo de tener [employer], tales sandra obesidad, diabetes, enfermedad cardíaca y presión arterial fior. · Wendy actividad física por lo menos 30 minutos la mayoría de los días de la [place]. Caminar es raj buena opción. Marilou Lace desee hacer otras actividades, sandra correr, nadar, [employer], o jugar al tenis u otros deportes de equipo. Discuta con ferreira médico cualquier cambio que quiera introducir en ferreira programa de ejercicios. · No fume ni permita que otros fumen cerca de usted.  Si necesita ayuda para dejar de fumar, hable con ferreira médico sobre programas y medicamentos para dejar de fumar. Pueden aumentar rosalee probabilidades de dejar el hábito para siempre. · Consulte con ferreira médico si presenta factores de riesgo de infecciones de transmisión sexual (STI, por rosalee siglas en inglés). Tener raj misha romel sexual (que no tenga STI y que no tenga relaciones sexuales con nadie más) es raj buena manera de evitar estas infecciones. · Utilice métodos anticonceptivos si no desea tener hijos en silvestre momento. Consulte con ferreira médico acerca de las opciones disponibles más adecuadas para usted. · Protéjase la piel del exceso de sol. [address] 10 a.m. y las 4 p.m., permanezca a la tram o [place] Elise con prendas de vestir y un sombrero de ala ancha. Use gafas de sol que bloqueen los isis ultravioleta. Póngase un protector solar de amplio espectro (SPF 30 o superior) en la piel expuesta, incluso cuando esté nublado. · Acuda al dentista [facility]EMEH [facility] veces al año para hacerse chequeos y limpiezas dentales. · En el automóvil, use el cinturón de seguridad. · Enrique alcohol en forma moderada, o no enrique nada. [place] significa no más de 2 bebidas al día si es hombre, y no más de 1 al día si es [place]. Siga las recomendaciones de ferreira médico acerca de cuándo hacerse determinados exámenes. Estas pruebas pueden detectar problemas a tiempo. Para ambos sexos  · Colesterol. Hágase un análisis de la grasa (colesterol) en la maynor después de los 21 años de [place]. Ferreira médico le indicará con qué frecuencia debe [employer] análisis según ferreira edad, rosalee antecedentes familiares u otros factores que pueden aumentar el riesgo de enfermedad cardíaca. · Presión arterial. Hágase marly la presión arterial marv raj visita de rutina al médico. Ferreira médico le dirá con qué frecuencia debe revisarse la presión arterial según ferreira edad, rosalee niveles de presión arterial y otros factores. · Visión. Hable con ferreira médico acerca de la frecuencia con que debe hacerse raj prueba de glaucoma. · Diabetes.  Pregúntele a ferreira médico si debería hacerse pruebas para la diabetes. · Cáncer de colon. Hágase raj prueba para el cáncer de colon a los 48 años. Usted podría hacerse raj de las [address]. Si tiene menos de [address], podría necesitar hacerse raj prueba antes si tiene factores de [place]. Los factores de riesgo incluyen si ya le santiago extraído un pólipo precanceroso del colon o si alguno de rosalee padres, hermanos o hijos lenz tenido cáncer de colon. Para las mujeres  · El examen de senos y la [place]. Pregúntele a chu médico cuándo debe hacerse un examen clínico de los senos (mamas) y [place]. Los expertos médicos no están de acuerdo en si raj hugh de menos de 48 años de edad debe o no hacerse estos exámenes o con qué frecuencia. Chu médico puede ayudarle a decidir qué es lo adecuado para usted. · La prueba de Papanicolaou y el examen Gifford Richard a hacerse pruebas de Papanicolaou a los 21 años. El Papanicolaou es la mejor manera de detectar el cáncer de sid uterino. Esta prueba suele ser parte del examen pélvico. Pregunte con qué frecuencia debe hacerse esta prueba. · Infecciones de transmisión sexual (STI, por rosalee siglas en inglés). Consulte si debe hacerse pruebas de detección de STI. Puede correr riesgo si tiene [employer] con más de [place] persona, especialmente si rosalee parejas no utilizan condones. Para los hombres  · Infecciones de transmisión sexual (STI). Consulte si debe hacerse pruebas de detección de STI. Puede correr riesgo si tiene [employer] con más de [place] persona, especialmente si usted no utiliza condón. · Examen para el cáncer testicular. Pregúntele a chu médico si debería hacerse un examen de testículos con regularidad. · Examen de próstata. Hable con chu médico para saber si debe hacerse un análisis de maynor para el cáncer de próstata (prueba del PSA, por rosalee siglas en inglés). Los expertos difieren en cuanto a si los hombres deben hacerse esta prueba y con qué frecuencia.  Jaime Jarvis especialistas lo recomiendan a las personas mayores de 39 años de origen afroamericano o que tienen un padre o un kay que tuvo cáncer de próstata antes de los 72 [place]. Cuándo debe pedir ayuda? Preste especial atención a los cambios en ferreira garima y asegúrese de comunicarse con ferreira médico si tiene problemas o síntomas que le preocupan. Dónde puede encontrar más información en inglés? Heather Bowens a http://sumi-ellyn.info/. Escriba P072 en la búsqueda para aprender más acerca de \"Visita de control para personas de 18 a 50 años: Instrucciones de cuidado - [ Well Visit, Ages 25 to 48: Care Instructions ]. \"  Revisado: 19 julio, 2016  Versión del contenido: 11.3  © 9131-8823 Healthwise, Incorporated. Las instrucciones de cuidado fueron adaptadas bajo licencia por Good [facility] Connections (which disclaims liability or warranty for this information). Si usted tiene [place] afección médica o sobre estas instrucciones, siempre pregunte a ferreira profesional de garima. [employer] niega toda garantía o responsabilidad por ferreira uso de esta información.

## 2017-08-27 PROBLEM — R53.83 FATIGUE: Status: ACTIVE | Noted: 2017-08-27

## 2017-08-27 PROBLEM — Z72.820 POOR SLEEP: Status: ACTIVE | Noted: 2017-08-27

## 2017-08-27 PROBLEM — B35.1 ONYCHOMYCOSIS: Status: ACTIVE | Noted: 2017-08-27

## 2017-08-27 PROBLEM — R20.2 PARESTHESIA OF BOTH HANDS: Status: ACTIVE | Noted: 2017-08-27

## 2017-08-27 PROBLEM — K21.9 GASTROESOPHAGEAL REFLUX DISEASE: Status: ACTIVE | Noted: 2017-08-27

## 2017-08-27 PROBLEM — G24.5 BLEPHAROSPASM: Status: ACTIVE | Noted: 2017-08-27

## 2017-12-13 LAB
25(OH)D3 SERPL-MCNC: 22.5 NG/ML (ref 32–100)
A-G RATIO,AGRAT: 1.9 RATIO (ref 1.1–2.6)
ABSOLUTE LYMPHOCYTE COUNT, 10803: 2.2 K/UL (ref 1–4.8)
ALBUMIN SERPL-MCNC: 4.4 G/DL (ref 3.5–5)
ALP SERPL-CCNC: 69 U/L (ref 25–115)
ALT SERPL-CCNC: 43 U/L (ref 5–40)
ANION GAP SERPL CALC-SCNC: 18 MMOL/L
AST SERPL W P-5'-P-CCNC: 23 U/L (ref 10–37)
AVG GLU, 10930: 106 MG/DL (ref 91–123)
BASOPHILS # BLD: 0 K/UL (ref 0–0.2)
BASOPHILS NFR BLD: 1 % (ref 0–2)
BILIRUB SERPL-MCNC: 0.6 MG/DL (ref 0.2–1.2)
BUN SERPL-MCNC: 12 MG/DL (ref 6–22)
CALCIUM SERPL-MCNC: 9.4 MG/DL (ref 8.4–10.4)
CHLORIDE SERPL-SCNC: 103 MMOL/L (ref 98–110)
CHOLEST SERPL-MCNC: 214 MG/DL (ref 110–200)
CO2 SERPL-SCNC: 23 MMOL/L (ref 20–32)
CREAT SERPL-MCNC: 0.9 MG/DL (ref 0.5–1.2)
EOSINOPHIL # BLD: 0.1 K/UL (ref 0–0.5)
EOSINOPHIL NFR BLD: 2 % (ref 0–6)
ERYTHROCYTE [DISTWIDTH] IN BLOOD BY AUTOMATED COUNT: 14.2 % (ref 10–16)
FE % SATURATION,PSAT: 36 % (ref 20–50)
FERRITIN SERPL-MCNC: 485 NG/ML (ref 22–322)
FOLATE,FOL: 11.43 NG/ML
GFRAA, 66117: >60
GFRNA, 66118: >60
GLOBULIN,GLOB: 2.3 G/DL (ref 2–4)
GLUCOSE SERPL-MCNC: 92 MG/DL (ref 70–99)
GRANULOCYTES,GRANS: 54 % (ref 40–75)
HBA1C MFR BLD HPLC: 5.3 % (ref 4.8–5.9)
HCT VFR BLD AUTO: 47.1 % (ref 39.3–51.6)
HDLC SERPL-MCNC: 40 MG/DL (ref 40–59)
HGB BLD-MCNC: 15.1 G/DL (ref 13.1–17.2)
IRON,IRN: 118 MCG/DL (ref 45–160)
LDLC SERPL CALC-MCNC: 136 MG/DL (ref 50–99)
LYMPHOCYTES, LYMLT: 36 % (ref 27–45)
MCH RBC QN AUTO: 29 PG (ref 26–34)
MCHC RBC AUTO-ENTMCNC: 32 G/DL (ref 32–36)
MCV RBC AUTO: 90 FL (ref 80–95)
MONOCYTES # BLD: 0.5 K/UL (ref 0.1–0.9)
MONOCYTES NFR BLD: 8 % (ref 3–9)
NEUTROPHILS # BLD AUTO: 3.2 K/UL (ref 1.8–7.7)
PLATELET # BLD AUTO: 238 K/UL (ref 140–440)
PMV BLD AUTO: 10.7 FL (ref 6–10.8)
POTASSIUM SERPL-SCNC: 4.2 MMOL/L (ref 3.5–5.5)
PROT SERPL-MCNC: 6.7 G/DL (ref 6.4–8.3)
RBC # BLD AUTO: 5.25 M/UL (ref 3.8–5.8)
SODIUM SERPL-SCNC: 144 MMOL/L (ref 133–145)
T4 FREE SERPL-MCNC: 1.5 NG/DL (ref 0.9–1.8)
TIBC,TIBC: 325 MCG/DL (ref 228–428)
TRIGL SERPL-MCNC: 192 MG/DL (ref 40–149)
TSH SERPL DL<=0.005 MIU/L-ACNC: 1.56 MCU/ML (ref 0.27–4.2)
UIBC SERPL-MCNC: 207 MCG/DL (ref 110–370)
VIT B12 SERPL-MCNC: 501 PG/ML (ref 211–911)
VLDLC SERPL CALC-MCNC: 38 MG/DL (ref 8–30)
WBC # BLD AUTO: 5.9 K/UL (ref 4–11)

## 2017-12-18 PROBLEM — E55.9 VITAMIN D DEFICIENCY: Status: ACTIVE | Noted: 2017-12-18

## 2017-12-18 NOTE — PROGRESS NOTES
With smoking, 8.3% 10yr risk, 50% lifetime risk. Rec mod-to-high intensity statin & smoking cessation. Low VitD @ 22.5    Mildly elevated TGs. Elevated ferritin, but normal % iron saturation. Normal CMP, TFT, b12, folate, CBC. Will discuss @ upcoming visit.   1/5/2018   1:00 PM    Fiorella Lowery MD       [address] [place] [address]

## 2018-01-05 ENCOUNTER — OFFICE VISIT (OUTPATIENT)
Dept: FAMILY MEDICINE CLINIC | Age: 48
End: 2018-01-05

## 2018-01-05 VITALS
OXYGEN SATURATION: 96 % | TEMPERATURE: 96.9 F | SYSTOLIC BLOOD PRESSURE: 129 MMHG | HEART RATE: 91 BPM | RESPIRATION RATE: 20 BRPM | HEIGHT: 73 IN | WEIGHT: 240 LBS | BODY MASS INDEX: 31.81 KG/M2 | DIASTOLIC BLOOD PRESSURE: 92 MMHG

## 2018-01-05 DIAGNOSIS — Z80.42 FAMILY HISTORY OF PROSTATE CANCER IN FATHER: ICD-10-CM

## 2018-01-05 DIAGNOSIS — K21.9 GASTROESOPHAGEAL REFLUX DISEASE, ESOPHAGITIS PRESENCE NOT SPECIFIED: ICD-10-CM

## 2018-01-05 DIAGNOSIS — E66.3 OVERWEIGHT: ICD-10-CM

## 2018-01-05 DIAGNOSIS — Z72.820 POOR SLEEP: ICD-10-CM

## 2018-01-05 DIAGNOSIS — E78.5 HYPERLIPIDEMIA, UNSPECIFIED HYPERLIPIDEMIA TYPE: ICD-10-CM

## 2018-01-05 DIAGNOSIS — B35.1 ONYCHOMYCOSIS: ICD-10-CM

## 2018-01-05 DIAGNOSIS — E55.9 VITAMIN D DEFICIENCY: Primary | ICD-10-CM

## 2018-01-05 DIAGNOSIS — Z11.1 SCREENING FOR TUBERCULOSIS: ICD-10-CM

## 2018-01-05 RX ORDER — ERGOCALCIFEROL 1.25 MG/1
50000 CAPSULE ORAL
Qty: 24 CAP | Refills: 0 | Status: SHIPPED | OUTPATIENT
Start: 2018-01-05 | End: 2018-03-28

## 2018-01-05 RX ORDER — OMEPRAZOLE 40 MG/1
40 CAPSULE, DELAYED RELEASE ORAL DAILY
Qty: 90 CAP | Refills: 3 | Status: SHIPPED | OUTPATIENT
Start: 2018-01-05 | End: 2018-09-06 | Stop reason: ALTCHOICE

## 2018-01-05 RX ORDER — CICLOPIROX 80 MG/ML
SOLUTION TOPICAL
Qty: 6.6 ML | Refills: 5 | Status: SHIPPED | OUTPATIENT
Start: 2018-01-05 | End: 2019-08-30 | Stop reason: SDUPTHER

## 2018-01-05 NOTE — PROGRESS NOTES
3 [facility]  Primary Care Office Visit - Problem-Oriented    : 1970   Isaac Voss is a 49 y.o. male presenting for  Chief Complaint   Patient presents with    GERD       Assessment/Plan:       ICD-10-CM   1. Vitamin D deficiency - new diagnosis  - start high dose repletion, followed by 2000 units daily     E55.9   2. Hyperlipidemia, unspecified hyperlipidemia type - new diagnosis  - risk >5%, recs mod-to-high intensity statin, however patient prefers lifestyle changes  - will work on exercise, diet, etc     E78.5   3. Family history of prostate cancer in father (who was dx @ age 79)  - will check PSA     Z80.42   4. Poor sleep - ongoing, intermittent issue, 2/2 anxiety  - prefers to wait on sleep study  - will work on relaxation techniques, given info re: guided meditation     Z72.820   5. Onychomycosis - notes some improvement, but ongoing  - continue penlac     B35.1   6. Gastroesophageal reflux disease, esophagitis presence not specified - well controlled  - no change to current regimen     K21.9   7. Screening for tuberculosis  - PPD placed today, returning on Monday for result     Z11.1   8. Eritrean speaking patient  - [ entire visit conducted in [place] ]    Z78.9     Overweight. I have reviewed/discussed the above normal BMI with the patient. I have recommended the following interventions: dietary management education, guidance, and counseling, encourage exercise and monitor weight.           Orders Placed This Encounter    LIPID PANEL     Standing Status:   Future     Standing Expiration Date:   2019    VITAMIN D, 25 HYDROXY     Standing Status:   Future     Standing Expiration Date:   3/3/3807    METABOLIC PANEL, COMPREHENSIVE     Standing Status:   Future     Standing Expiration Date:   2019    AMB POC TUBERCULOSIS, INTRADERMAL (SKIN TEST)    ergocalciferol (ERGOCALCIFEROL) 50,000 unit capsule     Sig: Take 1 Cap by mouth every Tuesday and Friday for 24 doses. Dispense:  24 Cap     Refill:  0    ciclopirox (PENLAC) 8 % solution     Sig: Apply to adjacent skin and affected nails daily. Remove with alcohol every 7 days. Dispense:  6.6 mL     Refill:  5    omeprazole (PRILOSEC) 40 mg capsule     Sig: Take 1 Cap by mouth daily. Dispense:  90 Cap     Refill:  3         This document may have been created with the aid of dictation software. Text may contain errors, particularly phonetic errors. Reviewed management plan & instructions with patient, who voiced understanding. Leydi Salmeron MD  Internal Medicine, Family Medicine & Sports Medicine  1/5/2018, 1:53 PM    Patient Instructions (provided in AVS): Instrucciones:  · empezar ferreira Vitamina D, y despues de los 3 meses, empezar 2000 units de Vitamin D3 cada chris para mantener ferreira nivel  · regresar en Lunes para ferreira tuberculosis skin test  · regresar en 5 meses para examen de maynor para rechequear ferreira Vitamin D y ferreira colesterol  · \"insight timer\" shanice      History:   Isaac Noble is a 49 y.o. male presenting to address:  Chief Complaint   Patient presents with    GERD     Ongoing sleep problems, but only intermittently. \"I can't turn my brain off, I have been like this all my life. \"  Wife notes only occasional snoring, no apnea. Has ongoing generalized fatigue, but otherwise doing well. Compliant with meds, denies any AE. Past Medical History:   Diagnosis Date    Acid reflux      Past Surgical History:   Procedure Laterality Date    HX HEMORRHOIDECTOMY  2005    HX HERNIA REPAIR Right 1982    HX VASECTOMY        reports that he has been smoking Cigars. He has never used smokeless tobacco. He reports that he drinks alcohol. He reports that he does not use illicit drugs. Social History     Social History Narrative    Originally from [facility]. Runs a [employer].     Lived in the [address] [place] [address],[address] since 2011.    (8/2017)     History   Smoking Status    Current Some Day Smoker    Types: Cigars   Smokeless Tobacco    Never Used     Family History   Problem Relation Age of Onset   Lalo Helm Cancer Mother      colon    Cancer Father      prostate     No Known Allergies    Problem List:      Patient Active Problem List    Diagnosis    Vitamin D deficiency    Poor sleep    Fatigue    Blepharospasm    Onychomycosis    Gastroesophageal reflux disease    Paresthesia of both hands    Italian speaking patient       Medications:     Current Outpatient Prescriptions   Medication Sig    omeprazole (PRILOSEC) 40 mg capsule Take 1 Cap by mouth daily.  ciclopirox (PENLAC) 8 % solution Apply to adjacent skin and affected nails daily. Remove with alcohol every 7 days. No current facility-administered medications for this visit. Review of Systems:     Review of Systems   Constitutional: Positive for malaise/fatigue. Negative for chills and fever. HENT: Negative for ear pain and sore throat. Respiratory: Negative for cough and shortness of breath. Cardiovascular: Negative for chest pain and palpitations. Gastrointestinal: Negative for abdominal pain. Genitourinary: Negative for dysuria. Musculoskeletal: Negative for myalgias. Skin: Negative for rash. Neurological: Negative for speech change, focal weakness and headaches. Endo/Heme/Allergies: Does not bruise/bleed easily. Psychiatric/Behavioral: Negative for depression. The patient is not nervous/anxious and does not have insomnia. Physical Assessment:   VS:    Vitals:    01/05/18 1343   BP: (!) 129/92   Pulse: 91   Resp: 20   Temp: 96.9 °F (36.1 °C)   TempSrc: Oral   SpO2: 96%   Weight: 240 lb (108.9 kg)   Height: 6' 1\" (1.854 m)   PainSc:   0 - No pain       Physical Exam   Constitutional: He is oriented to person, place, and time. He appears well-developed and well-nourished. No distress. + overweight   HENT:   Head: Normocephalic and atraumatic.    Right Ear: External ear normal.   Left Ear: External ear normal.   Mouth/Throat: Oropharynx is clear and moist.   Eyes: EOM are normal. Pupils are equal, round, and reactive to light. Neck: Normal range of motion. Neck supple. Cardiovascular: Normal rate, regular rhythm and normal heart sounds. No murmur heard. Pulmonary/Chest: Effort normal and breath sounds normal. No respiratory distress. He has no wheezes. Abdominal: Soft. Bowel sounds are normal. There is no tenderness. There is no rebound. Musculoskeletal: Normal range of motion. Neurological: He is alert and oriented to person, place, and time. No cranial nerve deficit. Skin: Skin is warm and dry. He is not diaphoretic. Psychiatric: He has a normal mood and affect. His behavior is normal. Judgment and thought content normal.   Nursing note reviewed. Recent Labs & Imaging:     Orders Only on 12/12/2017   Component Date Value Ref Range Status    Triglyceride 12/12/2017 192* 40 - 149 mg/dL Final    HDL Cholesterol 12/12/2017 40  40 - 59 mg/dL Final    Cholesterol, total 12/12/2017 214* 110 - 200 mg/dL Final    LDL, calculated 12/12/2017 136* 50 - 99 mg/dL Final    VLDL, calculated 12/12/2017 38* 8 - 30 mg/dL Final    Comment: Test includes cholesterol, HDL cholesterol, triglycerides and LDL. Cholesterol Recommended NCEP guidelines in mg/dL:  Less than 200      Desirable  200 - 239          Borderline High  Greater than or  = to 240   High      Glucose 12/12/2017 92  70 - 99 mg/dL Final    BUN 12/12/2017 12  6 - 22 mg/dL Final    Creatinine 12/12/2017 0.9  0.5 - 1.2 mg/dL Final    Sodium 12/12/2017 144  133 - 145 mmol/L Final    Potassium 12/12/2017 4.2  3.5 - 5.5 mmol/L Final    Chloride 12/12/2017 103  98 - 110 mmol/L Final    CO2 12/12/2017 23  20 - 32 mmol/L Final    AST (SGOT) 12/12/2017 23  10 - 37 U/L Final    ALT (SGPT) 12/12/2017 43* 5 - 40 U/L Final    Alk.  phosphatase 12/12/2017 69  25 - 115 U/L Final    Bilirubin, total 12/12/2017 0.6  0.2 - 1.2 mg/dL Final    Calcium 12/12/2017 9.4  8.4 - 10.4 mg/dL Final    Protein, total 12/12/2017 6.7  6.4 - 8.3 g/dL Final    Albumin 12/12/2017 4.4  3.5 - 5.0 g/dL Final    A-G Ratio 12/12/2017 1.9  1.1 - 2.6 ratio Final    Globulin 12/12/2017 2.3  2.0 - 4.0 g/dL Final    Anion gap 12/12/2017 18.0  mmol/L Final    Comment: Test includes Albumin, Alkaline Phosphatase, ALT, AST, BUN, Calcium, CO2,  Chloride, Creatinine, Glucose, Potassium, Sodium, Total Bilirubin and Total  Protein. Estimated GFR results are reported in mL/min/1.73 sq.m. by the MDRD equation. This eGFR is validated for stable chronic renal failure patients. This   equation  is unreliable in acute illness or patients with normal renal function.       GFRAA 12/12/2017 >60.0  >60.0 Final    GFRNA 12/12/2017 >60.0  >60.0 Final    Iron 12/12/2017 118  45 - 160 mcg/dL Final    UIBC 12/12/2017 207  110 - 370 mcg/dL Final    TIBC 12/12/2017 325  228 - 428 mcg/dL Final    Iron % saturation 12/12/2017 36  20 - 50 % Final    T4, Free 12/12/2017 1.5  0.9 - 1.8 ng/dL Final    Vitamin B12 12/12/2017 501  211 - 911 pg/mL Final    Folate 12/12/2017 11.43  >=3.10 ng/mL Final    Comment: Borderline deficient  2.2-3.0 ng/ml  Deficient             <2.2 ng/ml      Ferritin 12/12/2017 485* 22 - 322 ng/mL Final    VITAMIN D, 25-HYDROXY 12/12/2017 22.5* 32.0 - 100.0 ng/mL Final    TSH 12/12/2017 1.56  0.27 - 4.20 mcU/mL Final    WBC 12/12/2017 5.9  4.0 - 11.0 K/uL Final    RBC 12/12/2017 5.25  3.80 - 5.80 M/uL Final    HGB 12/12/2017 15.1  13.1 - 17.2 g/dL Final    HCT 12/12/2017 47.1  39.3 - 51.6 % Final    MCV 12/12/2017 90  80 - 95 fL Final    MCH 12/12/2017 29  26 - 34 pg Final    MCHC 12/12/2017 32  32 - 36 g/dL Final    RDW 12/12/2017 14.2  10.0 - 16.0 % Final    PLATELET 21/08/8471 956  140 - 440 K/uL Final    MPV 12/12/2017 10.7  6.0 - 10.8 fL Final    NEUTROPHILS 12/12/2017 54  40 - 75 % Final    Lymphocytes 12/12/2017 36  27 - 45 % Final    MONOCYTES 12/12/2017 8  3 - 9 % Final    EOSINOPHILS 12/12/2017 2  0 - 6 % Final    BASOPHILS 12/12/2017 1  0 - 2 % Final    ABS. NEUTROPHILS 12/12/2017 3.2  1.8 - 7.7 K/uL Final    ABSOLUTE LYMPHOCYTE COUNT 12/12/2017 2.2  1.0 - 4.8 K/uL Final    ABS. MONOCYTES 12/12/2017 0.5  0.1 - 0.9 K/uL Final    ABS. EOSINOPHILS 12/12/2017 0.1  0.0 - 0.5 K/uL Final    ABS.  BASOPHILS 12/12/2017 0.0  0.0 - 0.2 K/uL Final    Hemoglobin A1c 12/12/2017 5.3  4.8 - 5.9 % Final    AVG GLU 12/12/2017 106  91 - 123 mg/dL Final

## 2018-01-05 NOTE — PROGRESS NOTES
Isaac [place] is a 49 y.o. male (: 1970) presenting to address:    Chief Complaint   Patient presents with    GERD       Vitals:    18 1343   BP: (!) 129/92   Pulse: 91   Resp: 20   Temp: 96.9 °F (36.1 °C)   TempSrc: Oral   SpO2: 96%   Weight: 240 lb (108.9 kg)   Height: 6' 1\" (1.854 m)   PainSc:   0 - No pain       Hearing/Vision:   No exam data present    Learning Assessment:     Learning Assessment 2017   PRIMARY LEARNER Patient   HIGHEST LEVEL OF EDUCATION - PRIMARY LEARNER  > 4 YEARS OF COLLEGE   BARRIERS PRIMARY LEARNER LANGUAGE   CO-LEARNER CAREGIVER Yes   CO-LEARNER NAME wife   CO-LEARNER HIGHEST LEVEL OF EDUCATION > 4 YEARS OF COLLEGE   BARRIERS CO-LEARNER LANGUAGE   PRIMARY LANGUAGE Arabic   PRIMARY LANGUAGE CO-LEARNER Arabic    NEED No   LEARNER PREFERENCE PRIMARY READING   LEARNER PREFERENCE CO-LEARNER VIDEOS   LEARNING SPECIAL TOPICS no   ANSWERED BY self   RELATIONSHIP SELF     Depression Screening:     PHQ over the last two weeks 2018   Little interest or pleasure in doing things Not at all   Feeling down, depressed or hopeless Not at all   Total Score PHQ 2 0     Fall Risk Assessment:     Fall Risk Assessment, last 12 mths 2017   Able to walk? Yes   Fall in past 12 months? No     Abuse Screening:     Abuse Screening Questionnaire 2017   Do you ever feel afraid of your partner? N   Are you in a relationship with someone who physically or mentally threatens you? N   Is it safe for you to go home? Y     Coordination of Care Questionaire:   1. Have you been to the ER, urgent care clinic since your last visit? Hospitalized since your last visit? NO    2. Have you seen or consulted any other health care providers outside of the [address] [place] [address] since your last visit? Include any pap smears or colon screening. NO    Advanced Directive:   1. Do you have an Advanced Directive? NO    2. Would you like information on Advanced Directives?  Yes, forms given      Tuberculin skin test applied to Right ventral forearm. Explained how to read the test, measuring induration not just erythema; he will come into office to be read 48-72 hrs after administration.

## 2018-01-05 NOTE — MR AVS SNAPSHOT
Visit Information Daria Muñozrupa Personal Médico Departamento Teléfono del Dep. Número de visita 1/5/2018  1:00 PM [employer] [phone] [id number] Follow-up Instructions Return in about 6 months (around 7/5/2018) for 30min appt - Tongan. Upcoming Health Maintenance Date Due Pneumococcal 19-64 Medium Risk (1 of 1 - PPSV23) 11/28/1988 DTaP/Tdap/Td series (1 - Tdap) 11/28/1990 Influenza Age 5 to Adult 8/1/2017 Alergias  Review Complete El: 1/5/2018 Por: Estefany Avitia MD  
 A partir del:  1/5/2018 No Known Allergies Vacunas actuales Sherial Rings Miguel Kub TB Skin Test (PPD) Intradermal 1/5/2018 No revisadas esta visita You Were Diagnosed With   
  
 John Gibbs Vitamin D deficiency    -  Primary ICD-10-CM: E55.9 ICD-9-CM: 268.9 Hyperlipidemia, unspecified hyperlipidemia type     ICD-10-CM: E78.5 ICD-9-CM: 272.4 Tongan speaking patient     ICD-10-CM: Z78.9 ICD-9-CM: V40.1 Family history of prostate cancer in father     ICD-10-CM: Z80.41 
ICD-9-CM: V16.42 Poor sleep     ICD-10-CM: Z72.820 ICD-9-CM: V69.4 Onychomycosis     ICD-10-CM: B35.1 ICD-9-CM: 110.1 Gastroesophageal reflux disease, esophagitis presence not specified     ICD-10-CM: K21.9 ICD-9-CM: 530.81 Screening for tuberculosis     ICD-10-CM: Z11.1 ICD-9-CM: V74.1 Partes vitales PS Pulso Temperatura Resp [place] ( percentil de crecimiento) Peso (percentil de crecimiento) (!) 129/92 (BP 1 Location: Left arm, BP Patient Position: Sitting) 91 96.9 °F (36.1 °C) (Oral) 20 6' 1\" (1.854 m) 240 lb (108.9 kg) SpO2 BMI (IMC) Estatus de tabaquísmo 96% 31.66 kg/m2 Current Some Day Smoker Historial de signos vitales BMI and BSA Data Body Mass Index Body Surface Area  
 31.66 kg/m 2 2.37 m 2 [facility] Name Phone [facility]/PHARMACY [address] [facility] [phone] Ferreira lista de medicamentos actualizada Lista actualizada el: 1/5/18  2:42 PM.  Amilcar Solo use ferreira lista de medicamentos más reciente. ciclopirox 8 % solution También conocido sandra:  Yon Shouts Apply to adjacent skin and affected nails daily. Remove with alcohol every 7 days. ergocalciferol 50,000 unit capsule También conocido sandra:  ERGOCALCIFEROL Take 1 Cap by mouth every Tuesday and Friday for 24 doses. omeprazole 40 mg capsule También conocido sandra:  [address] Take 1 Cap by mouth daily. Recetas Enviado a la [place] Refills  
 ergocalciferol (ERGOCALCIFEROL) 50,000 unit capsule 0 Sig: Take 1 Cap by mouth every Tuesday and Friday for 24 doses. Class: Normal  
 Pharmacy: [address] [place] [address] Ph #: [phone] Route: Oral  
 ciclopirox (PENLAC) 8 % solution 5 Sig: Apply to adjacent skin and affected nails daily. Remove with alcohol every 7 days. Class: Normal  
 Pharmacy: [address] [place] [address] Ph #: [phone]  
 omeprazole (PRILOSEC) 40 mg capsule 3 Sig: Take 1 Cap by mouth daily. Class: Normal  
 Pharmacy: [address] [place] [address] Ph #: [phone] Route: Oral  
  
Hicimos lo siguiente AMB POC TUBERCULOSIS, INTRADERMAL (SKIN TEST) [63980 CPT(R)] Instrucciones de seguimiento Return in about 6 months (around 7/5/2018) for 30min appt - Pashto. Por hacer 06/05/2018 Lab:  LIPID PANEL   
  
 06/05/2018 Lab:  METABOLIC PANEL, COMPREHENSIVE   
  
 06/05/2018 Lab:  VITAMIN D, 25 HYDROXY Instrucciones para el Paciente Instrucciones: 
· empezar ferreira Vitamina D, y despues de los 3 meses, empezar 2000 units de Vitamin D3 cada chris para mantener ferreira nivel · regresar en Lunes para ferreira tuberculosis skin test 
· regresar en 5 meses para examen de maynor para rechequear ferriera Vitamin D y ferreira colesterol · \"insight timer\" shanice Introducing [facility]! [facility] introduce portal paciente MyChart . Ahora se puede acceder a partes de ferreira expediente médico, enviar por correo electrónico la oficina de ferreira médico y solicitar renovaciones de medicamentos en línea. En ferreira navegador de Internet , Milton Dominguez a [email or web address] Cristy clic en el usuario por Margziyadita Halo? Goran John clic aquí en la sesión [employer]. Verá la página de registro [place]. Ingrese ferreira código de [employer] ceci y sandra aparece a continuación. Usted no tendrá que [employer] código después de alayna completado el proceso de registro . Si usted no se inscribe antes de la fecha de caducidad , debe solicitar un nuevo código. · MyChart Código de acceso : [email or web address] Expires: 4/5/2018  2:42 PM 
 
Ingresa los últimos cuatro dígitos de ferreira Número de Seguro Social ( xxxx ) y fecha de nacimiento ( dd / mm / aaaa ) sandra se indica y cristy clic en Enviar. Usted será llevado a la siguiente página de registro . Crear un ID MyChart . Esta será ferreira ID de inicio de sesión de MyChart y no puede ser [place] , por lo que pensar en raj que es Lelon Hanly y fácil de recordar . Crear raj contraseña MyChart . Usted puede cambiar ferreira contraseña en cualquier momento . Ingrese ferreira Password Reset de preguntas y [place]on[place] . [place] se puede utilizar en un momento posterior si usted olvida ferreira contraseña. Introduzca ferreira dirección de correo electrónico . Elly Leon recibirá raj notificación por correo electrónico cuando la nueva información está disponible en MyChart . T[place]ar [place] clic en Registrarse. [place] Cl[place]s ruiz y descargar porciones de ferreira expediente médico. 
Cristy clic en el enlace de descarga del menú Resumen para descargar raj copia portátil de ferreira información médica . Si tiene [employer] , por favor visite la sección de preguntas frecuentes del sitio web MyChart . Recuerde, MyChart NO es que se utilizará para las necesidades urgentes. Para emergencias médicas , llame al 911 . Ahora disponible en ferreira iPhone y Android ! Por favor proporcione silvestre resumen de la documentación de cuidado a ferreira próximo proveedor. Your primary care clinician is listed as Chaka Irons. If you have any questions after today's visit, please call [phone].

## 2018-01-08 ENCOUNTER — CLINICAL SUPPORT (OUTPATIENT)
Dept: FAMILY MEDICINE CLINIC | Age: 48
End: 2018-01-08

## 2018-01-08 DIAGNOSIS — Z92.29 HISTORY OF BCG VACCINATION: Primary | ICD-10-CM

## 2018-01-08 LAB
MM INDURATION POC: 14 MM (ref 0–5)
PPD POC: NORMAL NEGATIVE

## 2018-01-08 NOTE — PROGRESS NOTES
PPD Reading Note  [facility] Result: 14 mm induration.   Interpretation: NEG

## 2018-04-27 RX ORDER — ERGOCALCIFEROL 1.25 MG/1
CAPSULE ORAL
Qty: 24 CAP | Refills: 0 | Status: SHIPPED | OUTPATIENT
Start: 2018-04-27 | End: 2019-09-13

## 2018-06-05 DIAGNOSIS — E55.9 VITAMIN D DEFICIENCY: ICD-10-CM

## 2018-06-05 DIAGNOSIS — Z80.42 FAMILY HISTORY OF PROSTATE CANCER IN FATHER: ICD-10-CM

## 2018-06-05 DIAGNOSIS — E78.5 HYPERLIPIDEMIA, UNSPECIFIED HYPERLIPIDEMIA TYPE: ICD-10-CM

## 2018-09-06 ENCOUNTER — OFFICE VISIT (OUTPATIENT)
Dept: FAMILY MEDICINE CLINIC | Age: 48
End: 2018-09-06

## 2018-09-06 VITALS
DIASTOLIC BLOOD PRESSURE: 83 MMHG | HEART RATE: 66 BPM | OXYGEN SATURATION: 96 % | HEIGHT: 73 IN | WEIGHT: 235 LBS | RESPIRATION RATE: 18 BRPM | SYSTOLIC BLOOD PRESSURE: 119 MMHG | TEMPERATURE: 97.1 F | BODY MASS INDEX: 31.14 KG/M2

## 2018-09-06 DIAGNOSIS — Z00.00 ROUTINE ADULT HEALTH MAINTENANCE: Primary | ICD-10-CM

## 2018-09-06 DIAGNOSIS — Z78.9 USES SPANISH AS PRIMARY SPOKEN LANGUAGE: ICD-10-CM

## 2018-09-06 DIAGNOSIS — Z72.820 POOR SLEEP: ICD-10-CM

## 2018-09-06 DIAGNOSIS — R53.83 FATIGUE, UNSPECIFIED TYPE: ICD-10-CM

## 2018-09-06 DIAGNOSIS — R06.83 SNORING: ICD-10-CM

## 2018-09-06 DIAGNOSIS — E66.9 CLASS 1 OBESITY WITH BODY MASS INDEX (BMI) OF 31.0 TO 31.9 IN ADULT, UNSPECIFIED OBESITY TYPE, UNSPECIFIED WHETHER SERIOUS COMORBIDITY PRESENT: ICD-10-CM

## 2018-09-06 DIAGNOSIS — Z28.21 INFLUENZA VACCINATION DECLINED: ICD-10-CM

## 2018-09-06 NOTE — PATIENT INSTRUCTIONS
Instrucciones: 
· hacer rosalee examenes de [place] · tratar a controlar ferreira reflujo con Radha Nathan. Si Ud tiene algunas sintomas, Ud puede tratar pepcid o zantac por necesario. · nuestro experimento: usar flonase (raj spray del nariz del estroide) cada noche antes de acostarse para 7-10 rojas. Si ferreira cantidad de ronquidos baja. ... entonces esta es la causa! 
 
 
notas de ferreira doctora: 
· vamos a ruiz rosalee examenes de maynor para ruiz si hay otros razones desde ferreira fatiga (menos de rosalee ronquidos) · voy a anni raj carta por correo con [employer]

## 2018-09-06 NOTE — PROGRESS NOTES
[facility] Primary Care Office Visit - Complete Physical 
 
Isaac Begum is a 49 y.o. male presenting for annual physical. 
: 1970 Assessment/Plan: 1. Routine adult health maintenance Declined influenza. (see below) 2. Poor sleep Ongoing, with associated 3. Fatigue, unspecified type And 
4. Snoring Intermittent. Check labs. Trial of nasal steroid qhs x 1-2 weeks to see if snoring improves. If ongoing, refer for sleep studies. Isaac Kincaid Paola is agreeable. - VITAMIN B12 & FOLATE; Future - FERRITIN; Future 
- IRON PROFILE; Future 
- TSH 3RD GENERATION; Future - T4, FREE; Future 5. Class 1 obesity with body mass index (BMI) of 31.0 to 31.9 in adult, unspecified obesity type, unspecified whether serious comorbidity present I have reviewed/discussed the above normal BMI with the patient. I have recommended the following interventions: dietary management education, guidance, and counseling, encourage exercise and monitor weight. 6. Uses Samoan as primary spoken language [ entire visit conducted in [place] ] 7. Influenza vaccination declined Orders & Diagnoses associated with This Encounter: ICD-10-CM ICD-9-CM 1. Routine adult health maintenance Z00.00 V70.0 2. Class 1 obesity with body mass index (BMI) of 31.0 to 31.9 in adult, unspecified obesity type, unspecified whether serious comorbidity present E66.9 278.00  
 Z68.31 V85.31  
3. Uses Samoan as primary spoken language Z78.9 V40.1 4. Poor sleep Z72.820 V69.4 5. Fatigue, unspecified type R53.83 780.79  
6. Influenza vaccination declined Z28.21 V64.06 Orders Placed This Encounter  VITAMIN B12 & FOLATE Standing Status:   Future Number of Occurrences:   1 Standing Expiration Date:   2019  FERRITIN Standing Status:   Future Number of Occurrences:   1   Standing Expiration Date:   2019  
 IRON PROFILE  
 Standing Status:   Future Number of Occurrences:   1 Standing Expiration Date:   9/7/2019  
 TSH 3RD GENERATION Standing Status:   Future Number of Occurrences:   1 Standing Expiration Date:   9/7/2019  T4, FREE Standing Status:   Future Number of Occurrences:   1 Standing Expiration Date:   9/7/2019 Management plan & patient instructions reviewed with Isaac Sierra, who voiced understanding. This document may have been created with the aid of dictation software. Text may contain errors, particularly phonetic errors. Holli Mayer MD 
Internal Medicine, Family Medicine & Sports Medicine 9/6/2018, 8:27 AM 
 
Patient Instructions (provided in AVS): Instrucciones: 
· hacer rosalee examenes de Aide · tratar a controlar ferreira reflujo con Monalisa Makos. Si Ud tiene algunas sintomas, Ud puede tratar pepcid o zantac por necesario. · nuestro experimento: usar flonase (raj spray del nariz del estroide) cada noche antes de acostarse para 7-10 rojas. Si ferreira cantidad de ronquidos baja. ... entonces esta es la causa! 
 
 
notas de ferreira doctora: 
· vamos a ruiz rosalee examenes de maynor para ruiz si hay otros razones desde ferreira fatiga (menos de rosalee ronquidos) · voy a anni raj carta por correo con [employer] History:  
Isaac Messina is a 49 y.o. male presenting for an annual physical. 
 
blepharospasms stopped. Bilateral shoulder and muscle tension. Thinking it might be 2/2 stress. Stopped omeprazole 2/2 concerns re: AE. 
GERD only an issue with dietary indiscretions. Feels exhausted all the time. \"My body doesn't feel like myself. \" 
Wife admits she is a bit worried. Does still snore. Notes some significant phlegm and rhinorrhea in the mornings, generally worse after snoring night before. Past Medical History:  
Diagnosis Date  Acid reflux Past Surgical History:  
Procedure Laterality Date  HX HEMORRHOIDECTOMY  2005 [address]90  HX VASECTOMY    
 
 reports that he has been smoking Cigars. He has never used smokeless tobacco. He reports that he drinks alcohol. He reports that he does not use illicit drugs. Social History Social History Narrative Originally from [facility]. Runs a [employer]. Lived in the [address] [place] [address],[address] since 2011.  
 (8/2017) History Smoking Status  Current Some Day Smoker  Types: Cigars Smokeless Tobacco  
 Never Used Family History Problem Relation Age of Onset  Cancer Mother   
  colon  Cancer Father 79  
  prostate No Known Allergies Problem List:  
  
Patient Active Problem List  
 Diagnosis  History of BCG vaccination  Family history of prostate cancer in father  Overweight  Vitamin D deficiency  Poor sleep  Fatigue  Blepharospasm  Onychomycosis  Gastroesophageal reflux disease  Paresthesia of both hands  Citizen of [place] speaking patient Medications:  
 
Current Outpatient Prescriptions Medication Sig  
 ciclopirox (PENLAC) 8 % solution Apply to adjacent skin and affected nails daily. Remove with alcohol every 7 days.  omeprazole (PRILOSEC) 40 mg capsule Take 1 Cap by mouth daily. No current facility-administered medications for this visit. Review of Systems:  
 
Review of Systems Constitutional: Positive for malaise/fatigue. Negative for chills and fever. HENT: Positive for congestion. Negative for ear pain and sore throat. Respiratory: Negative for cough and shortness of breath. Cardiovascular: Negative for chest pain and palpitations. Gastrointestinal: Negative for abdominal pain. Genitourinary: Negative for dysuria. Musculoskeletal: Positive for myalgias and neck pain. Skin: Negative for rash. Neurological: Negative for speech change, focal weakness and headaches. Endo/Heme/Allergies: Does not bruise/bleed easily. Psychiatric/Behavioral: Negative for depression. The patient is not nervous/anxious and does not have insomnia. Physical Assessment:  
VS:   
Visit Vitals  /83 (BP 1 Location: Right arm, BP Patient Position: Sitting)  Pulse 66  Temp 97.1 °F (36.2 °C) (Oral)  Resp 18  Ht 6' 1\" (1.854 m)  Wt 235 lb (106.6 kg)  SpO2 96%  BMI 31 kg/m2 Physical Exam  
Constitutional: He is oriented to person, place, and time. He appears well-developed and well-nourished. No distress. + overweight HENT:  
Head: Normocephalic and atraumatic. Right Ear: External ear normal.  
Left Ear: External ear normal.  
Mouth/Throat: Oropharynx is clear and moist.  
Eyes: EOM are normal. Pupils are equal, round, and reactive to light. Neck: Normal range of motion. Neck supple. Cardiovascular: Normal rate, regular rhythm and normal heart sounds. No murmur heard. Pulmonary/Chest: Effort normal and breath sounds normal. No respiratory distress. He has no wheezes. Abdominal: Soft. Bowel sounds are normal. There is no tenderness. There is no rebound. Musculoskeletal: Normal range of motion. Cervical back: He exhibits spasm (B trapezius with TrP with p!). Neurological: He is alert and oriented to person, place, and time. No cranial nerve deficit. Skin: Skin is warm and dry. He is not diaphoretic. Psychiatric: He has a normal mood and affect. His behavior is normal. Judgment and thought content normal.  
Nursing note reviewed.

## 2018-09-06 NOTE — PROGRESS NOTES
Isaac Knight is a 49 y.o. male (: 1970) presenting to address: Chief Complaint Patient presents with  Vitamin D Deficiency  GERD  Fatigue Patient DECLINED flu/TDAP vaccine. Vitals:  
 18 0820 BP: 119/83 Pulse: 66 Resp: 18 Temp: 97.1 °F (36.2 °C) TempSrc: Oral  
SpO2: 96% Weight: 235 lb (106.6 kg) Height: 6' 1\" (1.854 m) PainSc:   0 - No pain Hearing/Vision: No exam data present Learning Assessment:  
 
Learning Assessment 2017 PRIMARY LEARNER Patient HIGHEST LEVEL OF EDUCATION - PRIMARY LEARNER  > 4 YEARS OF COLLEGE  
BARRIERS PRIMARY LEARNER LANGUAGE  
CO-LEARNER CAREGIVER Yes CO-LEARNER NAME wife CO-LEARNER HIGHEST LEVEL OF EDUCATION > 4 YEARS OF COLLEGE  
BARRIERS CO-LEARNER LANGUAGE PRIMARY LANGUAGE Latvian PRIMARY LANGUAGE CO-LEARNER  NEED No  
LEARNER PREFERENCE PRIMARY READING  
LEARNER PREFERENCE CO-LEARNER VIDEOS  
LEARNING SPECIAL TOPICS no  
ANSWERED BY self RELATIONSHIP SELF Depression Screening: PHQ over the last two weeks 2018 Little interest or pleasure in doing things Not at all Feeling down, depressed, irritable, or hopeless Not at all Total Score PHQ 2 0 Fall Risk Assessment:  
 
Fall Risk Assessment, last 12 mths 2017 Able to walk? Yes Fall in past 12 months? No  
 
Abuse Screening:  
 
Abuse Screening Questionnaire 2017 Do you ever feel afraid of your partner? Angela Distel Are you in a relationship with someone who physically or mentally threatens you? Angela Distel Is it safe for you to go home? Gordy Quinones Coordination of Care Questionaire: 1. Have you been to the ER, urgent care clinic since your last visit? Hospitalized since your last visit? NO 
 
2. Have you seen or consulted any other health care providers outside of the [address] [place] [address] since your last visit? Include any pap smears or colon screening. NO Advanced Directive: 1. Do you have an Advanced Directive? NO 
 
2. Would you like information on Advanced Directives?  NO

## 2018-09-06 NOTE — MR AVS SNAPSHOT
[address] [place] [address] [address] [facility] [address] [phone] Patient: Jeremias Voss MRN: [id number] :1970 Visit Information Antony Littlejohn Personal Médico Departamento Teléfono del Dep. Número de visita 2018  8:00 AM Maame Morse, Andrés [facility] [phone] Follow-up Instructions Return in about 6 months (around 3/6/2019) for 30min appt - [place]. Upcoming Health Maintenance Date Due Pneumococcal 19-64 Medium Risk (1 of 1 - PPSV23) 1988 Influenza Age 5 to Adult 3/31/2019* DTaP/Tdap/Td series (1 - Tdap) 2019* *Topic was postponed. The date shown is not the original due date. Alergias  Review Complete El: 2018 Por: Maame Morse MD  
 A partir del:  2018 No Known Allergies Vacunas actuales Abhi Ware Master TB Skin Test (PPD) Intradermal 2018 No revisadas esta visita You Were Diagnosed With   
  
 Jaz Galeano adult health maintenance    -  Primary ICD-10-CM: Z00.00 ICD-9-CM: V70.0 Class 1 obesity with body mass index (BMI) of 31.0 to 31.9 in adult, unspecified obesity type, unspecified whether serious comorbidity present     ICD-10-CM: E66.9, Z68.31 
ICD-9-CM: 278.00, V85.31 Uses [place] Arabian as primary spoken language     ICD-10-CM: Z78.9 ICD-9-CM: V40.1 Poor sleep     ICD-10-CM: Z72.820 ICD-9-CM: V69.4 Fatigue, unspecified type     ICD-10-CM: R53.83 ICD-9-CM: 780.79 Partes vitales PS Pulso Temperatura Resp Beccaria ( percentil de crecimiento) Peso (percentil de crecimiento) 119/83 (BP 1 Location: Right arm, BP Patient Position: Sitting) 66 97.1 °F (36.2 °C) (Oral) 18 6' 1\" (1.854 m) 235 lb (106.6 kg) SpO2 BMI (IMC) Estatus de tabaquísmo 96% 31 kg/m2 Current Some Day Smoker Historial de signos vitales BMI and BSA Data Body Mass Index Body Surface Area  
 31 kg/m 2 2.34 m 2 Tasha Yeh Pharmacy Name Phone [facility] [address] [facility] [phone] Chu lista de medicamentos actualizada Lista actualizada 9/6/18  8:52 AM.  Ellin Loraine use chu lista de medicamentos más reciente. ciclopirox 8 % solution También conocido sandra:  Mil Farrell Apply to adjacent skin and affected nails daily. Remove with alcohol every 7 days. Instrucciones de seguimiento Return in about 6 months (around 3/6/2019) for 30min appt - Belarusian. Por hacer 09/06/2018 Lab:  FERRITIN   
  
 09/06/2018 Lab:  IRON PROFILE   
  
 09/06/2018 Lab:  T4, FREE   
  
 09/06/2018 Lab:  TSH 3RD GENERATION   
  
 09/06/2018 Lab:  VITAMIN B12 & FOLATE Instrucciones para el Paciente Instrucciones: 
· hacer rosalee examenes de [place] · tratar a controlar chu reflujo con Aubery Cambric. Si Ud tiene algunas sintomas, Ud puede tratar pepcid o zantac por necesario. · nuestro experimento: usar flonase (raj spray del nariz del estroide) cada noche antes de acostarse para 7-10 rojas. Si chu cantidad de ronquidos baja. ... entonces esta es la causa! 
 
 
notas de chu doctora: 
· vamos a ruiz rosalee examenes de maynor para ruiz si hay otros razones desde chu fatiga (menos de rsoalee ronquidos) · voy a anni raj carta por correo con [employer] Introducing [facility]! [facility] introduce portal paciente MyChart . Ahora se puede acceder a partes de chu expediente médico, enviar por correo electrónico la oficina de chu médico y solicitar renovaciones de medicamentos en línea. En chu navegador de Internet , Blank Nickels a [email or web address] Wendy emory en el usuario por Vianney Files? Pavel cat aquí en la sesión Kahlil Holley. Verá la página de registro [place]. Ingrese ferreira código de [employer] ceci y sandra aparece a continuación. Usted no tendrá que [employer] código después de alayna completado el proceso de registro . Si usted no se inscribe antes de la fecha de caducidad , debe solicitar un nuevo código. · MyChart Maria Alejandra Fontenot : [email or web address] Expires: 12/5/2018  8:52 AM 
 
Ingresa los últimos cuatro dígitos de ferreira Número de Seguro Social ( xxxx ) y fecha de nacimiento ( dd / mm / aaaa ) sandra se indica y wendy clic en Enviar. Usted será llevado a la siguiente página de registro . Crear un ID MyChart . Esta será ferreira ID de inicio de sesión de MyChart y no puede ser [place] , por lo que pensar en raj que es Mejía Saver y fácil de recordar . Crear raj contraseña MyChart . Usted puede cambiar ferreira contraseña en cualquier momento . Ingrese ferreira Password Reset de preguntas y [place] . [place] se puede utilizar en un momento posterior si usted olvida ferreira contraseña. Introduzca ferreira dirección de correo electrónico . Diana Cline recibirá raj notificación por correo electrónico cuando la nueva información está disponible en MyChart . Lino cat en Registrarse. Hedy Mcclellan ruiz y descargar porciones de ferreira expediente médico. 
Wendy emory en el enlace de descarga del menú Resumen para descargar raj copia portátil de ferreira información médica . Si tiene [employer] , por favor visite la sección de preguntas frecuentes del sitio web MyChart . Recuerde, MyChart NO es que se utilizará para las necesidades urgentes. Para emergencias médicas , llame al 911 . Ahora disponible en ferreira iPhone y Android ! Por favor proporcione silvestre resumen de la documentación de cuidado a ferreira próximo proveedor. Your primary care clinician is listed as June Phy. If you have any questions after today's visit, please call [phone].

## 2018-09-07 LAB
FE % SATURATION,PSAT: 23 % (ref 20–50)
FERRITIN SERPL-MCNC: 432 NG/ML (ref 22–322)
FOLATE,FOL: 12.86 NG/ML
IRON,IRN: 75 MCG/DL (ref 45–160)
T4 FREE SERPL-MCNC: 1.4 NG/DL (ref 0.9–1.8)
TIBC,TIBC: 325 MCG/DL (ref 228–428)
TSH SERPL DL<=0.005 MIU/L-ACNC: 1.08 MCU/ML (ref 0.27–4.2)
UIBC SERPL-MCNC: 250 MCG/DL (ref 110–370)
VIT B12 SERPL-MCNC: 531 PG/ML (ref 211–911)

## 2019-08-30 DIAGNOSIS — B35.1 ONYCHOMYCOSIS: ICD-10-CM

## 2019-08-30 RX ORDER — CICLOPIROX 80 MG/ML
SOLUTION TOPICAL
Qty: 6.6 ML | Refills: 5 | Status: SHIPPED | OUTPATIENT
Start: 2019-08-30 | End: 2021-02-08

## 2019-09-12 NOTE — PROGRESS NOTES
[facility]  Primary Care Office Visit - Complete Physical     Isaac Butler is a 51 y.o. male presenting for annual physical.  : 1970        Assessment/Plan:     1. Routine adult health maintenance  - HEMOGLOBIN A1C WITH EAG; Future  - CBC WITH AUTOMATED DIFF; Future  - METABOLIC PANEL, COMPREHENSIVE; Future  - T4, FREE; Future  - LIPID PANEL; Future  - TSH 3RD GENERATION; Future    2. Vitamin D deficiency  Unclear control.  - VITAMIN D, 25 HYDROXY; Future    3. Family history of prostate cancer in father  - PSA W/ REFLX FREE PSA; Future    4. Family history of colon cancer in mother  c-scope due in .    5. Overweight (BMI 25.0-29. 9)  Losing weight. Positive reinforcement given. I have reviewed/discussed the above normal BMI with the patient. I have recommended the following interventions: dietary management education, guidance, and counseling, encourage exercise and monitor weight. 6. Uses Estonian as primary spoken language  [ entire visit conducted in [address] [place] [address] ]     7. Influenza vaccination declined      Orders & Diagnoses associated with This Encounter:         ICD-10-CM ICD-9-CM   1. Routine adult health maintenance Z00.00 V70.0   2. Vitamin D deficiency E55.9 268.9   3. Family history of prostate cancer in father Z80.41 V15.41   4. Family history of colon cancer in mother Z80.0 V16.0   11. Overweight (BMI 25.0-29. 9) E66.3 278.02   6. Uses Estonian as primary spoken language Z78.9 V40.1   7.  Influenza vaccination declined Z28.21 V64.06       Orders Placed This Encounter    HEMOGLOBIN A1C WITH EAG     Standing Status:   Future     Number of Occurrences:   1     Standing Expiration Date:   2020    CBC WITH AUTOMATED DIFF     Standing Status:   Future     Number of Occurrences:   1     Standing Expiration Date:       METABOLIC PANEL, COMPREHENSIVE     Standing Status:   Future     Number of Occurrences:   1     Standing Expiration Date:   2020   Michelle Rees T4, FREE     Standing Status:   Future     Number of Occurrences:   1     Standing Expiration Date:   9/13/2020    VITAMIN D, 25 HYDROXY     Standing Status:   Future     Number of Occurrences:   1     Standing Expiration Date:   9/13/2020    LIPID PANEL     Standing Status:   Future     Number of Occurrences:   1     Standing Expiration Date:   9/13/2020    TSH 3RD GENERATION     Standing Status:   Future     Number of Occurrences:   1     Standing Expiration Date:   9/13/2020    PSA W/ REFLX FREE PSA     Standing Status:   Future     Number of Occurrences:   1     Standing Expiration Date:   9/13/2020    chlorphen/pseudoeph/ibuprofen (ADVIL MULTI-SYMPTOM COLD PO)     Sig: Take  by mouth. Management plan & patient instructions reviewed with Isaac Jenkins, who voiced understanding. This document may have been created with the aid of dictation software. Text may contain errors, particularly phonetic errors. Kedar St MD  Internal Medicine, Family Medicine & Sports Medicine  9/13/2019         Subjective   History:   Isaac Jenkins is a 51 y.o. male presenting for an annual physical.    Sx of URI / cold x 1 week, getting better. Trying to lose weight, eating healthy. Felt better after completing high dose VitD repletion. Past Medical History:   Diagnosis Date    Acid reflux      Past Surgical History:   Procedure Laterality Date    HX HEMORRHOIDECTOMY  2005    HX HERNIA REPAIR Right 1982    HX VASECTOMY        reports that he has been smoking cigars. He has never used smokeless tobacco. He reports that he drinks alcohol. He reports that he does not use drugs. Social History     Social History Narrative    Originally from [facility]. Runs a [employer].     Lived in the [address] [place] [address],[address] since 2011.    (8/2017)     Social History     Tobacco Use   Smoking Status Current Some Day Smoker    Types: Cigars   Smokeless Tobacco Never Used     Family History   Problem Relation Age of Onset    Cancer Mother 58        colon   [facility] Cancer Father 72        prostate     No Known Allergies    Problem List:      Patient Active Problem List    Diagnosis    History of BCG vaccination    Family history of prostate cancer in father    Overweight    Vitamin D deficiency    Poor sleep    Fatigue    Blepharospasm    Onychomycosis    Gastroesophageal reflux disease    Paresthesia of both hands    Uses Czech as primary spoken language       Medications:     Current Outpatient Medications   Medication Sig    chlorphen/pseudoeph/ibuprofen (ADVIL MULTI-SYMPTOM COLD PO) Take  by mouth.  ciclopirox (PENLAC) 8 % solution APPLY TO ADJACENT SKIN AND AFFECTED NAILS DAILY. REMOVE WITH ALCOHOL EVERY 7 DAYS. No current facility-administered medications for this visit. Review of Systems:     Review of Systems   Constitutional: Negative for chills and fever. HENT: Positive for congestion and sore throat. Negative for ear pain. Respiratory: Negative for cough and shortness of breath. Cardiovascular: Negative for chest pain and palpitations. Gastrointestinal: Negative for abdominal pain. Genitourinary: Negative for dysuria. Musculoskeletal: Negative for myalgias. Skin: Negative for rash. Neurological: Negative for speech change, focal weakness and headaches. Endo/Heme/Allergies: Does not bruise/bleed easily. Psychiatric/Behavioral: Negative for depression. The patient is not nervous/anxious and does not have insomnia.              Objective   Physical Assessment:   VS:    Visit Vitals  /88 (BP 1 Location: Right arm, BP Patient Position: Sitting) Comment: manual   Pulse 68   Temp 97.8 °F (36.6 °C) (Oral)   Resp 16   Ht 6' 1\" (1.854 m)   Wt 226 lb 3.2 oz (102.6 kg)   SpO2 97%   BMI 29.84 kg/m²     Wt Readings from Last 3 Encounters:   09/13/19 226 lb 3.2 oz (102.6 kg)   09/06/18 235 lb (106.6 kg)   01/05/18 240 lb (108.9 kg)       Physical Exam Constitutional: He is oriented to person, place, and time. He appears well-developed and well-nourished. No distress. HENT:   Head: Normocephalic and atraumatic. Right Ear: External ear normal.   Left Ear: External ear normal.   Mouth/Throat: Oropharynx is clear and moist.   Eyes: Pupils are equal, round, and reactive to light. EOM are normal.   Neck: Normal range of motion. Neck supple. Cardiovascular: Normal rate, regular rhythm and normal heart sounds. No murmur heard. Pulmonary/Chest: Effort normal and breath sounds normal. No respiratory distress. He has no wheezes. Musculoskeletal: Normal range of motion. Neurological: He is alert and oriented to person, place, and time. No cranial nerve deficit. Skin: Skin is warm and dry. He is not diaphoretic. Psychiatric: He has a normal mood and affect. His behavior is normal. Judgment and thought content normal.   Nursing note reviewed.

## 2019-09-12 NOTE — PATIENT INSTRUCTIONS
Instrucciones:  - decir a las secretarias en el frente que Ud necesita hacer rosalee examenes de maynor      notas de ferreira doctora:  - next colonoscopy due in 2020  - vamos a chequear ferreira PSA hoy.  - tambien vamos a rechequear ferreira nivel de vitamina D. Si es [place], podemos hacer un otra vez el dosis muy alto que requiere raj receta para 3 meses para ayudarle a aumentarlo a lo normal.  Despues, para mantener un nivel mas saludable, Ud debe marly Vitamina D3 lo minimo de 4000 units cada chris. Visita de control para personas de 18 a 50 años: Instrucciones de cuidado - [ Well Visit, Ages 25 to 48: Care Instructions ]  Instrucciones de cuidado    Los exámenes físicos pueden ayudarle a mantenerse saludable. Ferreira médico lenz revisado ferreira estado general de garima y podría haberle dado algunos consejos para cuidarse. También podría haberle recomendado otros exámenes. En ferreira hogar, usted puede ayudar a prevenir enfermedades si come de manera saludable, hace ejercicio con regularidad y sigue otras recomendaciones. La atención de seguimiento es raj parte clave de ferreira tratamiento y seguridad. Asegúrese de hacer y acudir a todas las citas, y llame a ferreira médico si está teniendo problemas. También es raj buena idea saber los resultados de rosalee exámenes y mantener raj lista de los medicamentos que brigitte. ¿Cómo puede cuidarse en el hogar? · Alcance un peso saludable y [place]. [place] disminuirá el riesgo de tener [employer], tales sandra obesidad, diabetes, enfermedad cardíaca y presión arterial fior. · Wendy actividad física por lo menos 30 minutos la mayoría de los días de la [place]. Caminar es raj buena opción. Rosezella Neither desee hacer otras actividades, sandra barrettr, matt, [employer], o jugar al tenis u otros deportes de equipo. Discuta con ferreira médico cualquier cambio que quiera introducir en ferreira programa de ejercicios. · No fume ni permita que otros fumen cerca de usted.  Si necesita ayuda para dejar de fumar, hable con ferreira médico sobre programas y medicamentos para dejar de fumar. Pueden aumentar rosalee probabilidades de dejar el hábito para siempre. · Consulte con ferreira médico si presenta factores de riesgo de infecciones de transmisión sexual (STI, por rosalee siglas en inglés). Tener raj misha romel sexual (que no tenga STI y que no tenga relaciones sexuales con nadie más) es raj buena manera de evitar estas infecciones. · Utilice métodos anticonceptivos si no desea tener hijos en silvestre momento. Consulte con ferreira médico acerca de las opciones disponibles más adecuadas para usted. · Protéjase la piel del exceso de sol. [address] 10 a.m. y las 4 p.m., permanezca a la tram o [place] con prendas de vestir y un sombrero de ala ancha. Use gafas de sol que bloqueen los isis ultravioleta. Póngase un protector solar de amplio espectro (SPF 30 o superior) en la piel expuesta, incluso cuando esté nublado. · Acuda al dentista [facility] veces al año para hacerse chequeos y limpiezas dentales. · En el automóvil, use el cinturón de seguridad. Siga las recomendaciones de ferreira médico acerca de cuándo hacerse determinados exámenes. Estas pruebas pueden detectar problemas a tiempo. Para ambos sexos  · Colesterol. Hágase un análisis de la grasa (colesterol) en la maynor después de los 21 años de [place]. Ferreira médico le indicará con qué frecuencia debe [employer] análisis según ferreira edad, rosalee antecedentes familiares u otros factores que pueden aumentar el riesgo de enfermedad cardíaca. · Presión arterial. Hágase marly la presión arterial marv raj visita de rutina al médico. Ferreira médico le dirá con qué frecuencia debe revisarse la presión arterial según ferreira edad, rosalee niveles de presión arterial y otros factores. · Visión. Hable con ferreira médico acerca de la frecuencia con que debe hacerse raj prueba de glaucoma. · Diabetes. Pregúntele a ferreira médico si debería hacerse pruebas para la diabetes. · Cáncer de colon.  Ferreira riesgo de cáncer colorrectal aumenta a medida que envejece. Algunos especialistas dicen que los adultos deberían comenzar a hacerse pruebas de detección regulares a los 48 años y dejar de hacérselas a los 76 años. Otros dicen que hay que comenzar antes de los 48 años o continuar después de los [address] [facility]. Hable con ferreira médico acerca de ferreira riesgo y de cuándo comenzar y dejar de hacerse pruebas de detección. Para las mujeres  · Examen de [facility] y [place]. Pregúntele a ferreira médico cuándo debe hacerse un examen clínico de los senos (mamas) y [place]. Los expertos médicos no están de acuerdo en si raj hugh de menos de 48 años de edad debe o no hacerse estos exámenes o con qué frecuencia. Ferreira médico puede ayudarle a decidir qué es lo adecuado para usted. · Prueba de detección del cáncer del sid uterino y examen pélvico. Comience a hacerse la prueba de Papanicolaou a los 21 años. La prueba a menudo es parte de un examen pélvico. A partir de los [address] [facility], puede optar por hacerse raj prueba de [place]apan[place]ola[place], [facility] prueba del [facility] o ambas pruebas al mismo tiempo (lo que se llama prueba doble o conjunta). Hable con ferreira médico sobre la frecuencia con la que debe realizarse las pruebas. · Infecciones de transmisión sexual (STI, por rosalee siglas en inglés). Consulte si debe hacerse pruebas de detección de STI. Puede correr riesgo si tiene [employer] con más de [place] persona, especialmente si rosalee parejas no utilizan condones. Para los hombres  · Infecciones de transmisión sexual (STI). Consulte si debe hacerse pruebas de detección de STI. Puede correr riesgo si tiene [employer] con más de [place] persona, especialmente si usted no utiliza condón. · Examen para el cáncer testicular. Pregúntele a ferreira médico si debería hacerse un examen de testículos con regularidad. · Examen de próstata. Hable con ferreira médico para saber si debe hacerse un análisis de maynor para el cáncer de próstata (prueba del PSA, por rosalee siglas en inglés). Los expertos difieren en cuanto a si los hombres deben hacerse esta prueba y con qué frecuencia. Algunos especialistas lo recomiendan a las personas mayores de 39 años de origen afroamericano o que tienen un padre o un kay que tuvo cáncer de próstata antes de los 65 [place]. ¿Cuándo debe pedir ayuda? Preste especial atención a los cambios en ferreira garima y asegúrese de comunicarse con ferreira médico si tiene problemas o síntomas que le preocupan. ¿Dónde puede encontrar más información en inglés? Yusef Kurtz a http://sumi-ellyn.info/. Escriba P072 en la búsqueda para aprender más acerca de \"Visita de control para personas de 18 a 50 años: Instrucciones de cuidado - [ Well Visit, Ages 25 to 48: Care Instructions ]. \"  Revisado: 13 abram, 2018  Versión del contenido: 12.1  © 3261-1996 Healthwise, Incorporated. Las instrucciones de cuidado fueron adaptadas bajo licencia por [employer] (which disclaims liability or warranty for this information). Si usted tiene [place] afección médica o sobre estas instrucciones, siempre pregunte a ferreira profesional de garima. Healthwise, Incorporated niega toda garantía o responsabilidad por ferreira uso de esta información. Prueba del antígeno prostático específico: Acerca de esta prueba - [ Prostate-Specific Antigen (PSA) Test: About This Test ]  ¿Qué es raj prueba del antígeno prostático específico?    La prueba del antígeno prostático específico (APE o PSA, por rosalee siglas en inglés) mide la cantidad de APE en la maynor. La glándula prostática del hombre segrega APE en la maynor. Tener un nivel alto de antígeno prostático específico puede significar que tiene raj infección, cáncer o un aumento del tamaño de la próstata. ¿Por qué se hace esta prueba? Usted podría hacerse esta prueba para:  · Revisar si tiene cáncer de próstata. · Observar el cáncer de próstata y ruiz si el tratamiento está dando resultado.   ¿Cómo puede prepararse para la prueba? No eyacule marv los 2 días anteriores a la prueba de APE en la maynor, ni en relaciones sexuales ni en masturbación. ¿Qué sucede antes de la prueba? Dígale al médico si:  · Se ha hecho un examen para observar la vejiga (cistoscopia) en las últimas semanas. · Se ha hecho raj biopsia de próstata en las últimas semanas. · Ha tenido raj infección de próstata o del tracto urinario que no ha desaparecido. · Le santiago insertado recientemente en la vejiga un tubo (sonda) para drenarle la orina. ¿Qué sucede marv la prueba? Un profesional de [facility] brigitte raj muestra de [place]. ¿Qué sucede después de la prueba? Puede volver a rosalee actividades habituales de inmediato. ¿Cuándo debe pedir ayuda? Preste especial atención a los cambios en ferreira garima y asegúrese de comunicarse con ferreira médico si tiene cualquier pregunta sobre esta prueba. La atención de seguimiento es raj parte clave de ferreira tratamiento y seguridad. Asegúrese de hacer y acudir a todas las citas, y llame a ferreira médico si está teniendo problemas. También es raj buena idea mantener raj lista de los medicamentos que brigitte. Pregúntele a ferreira médico cuándo espera tener los [employer] [facility]. ¿Dónde puede encontrar más información en inglés? Yusef Kurtz a http://sumi-ellyn.info/. Carolin Oakley W534 en la búsqueda para aprender más acerca de \"Prueba del antígeno prostático específico: Acerca de esta prueba - [ Prostate-Specific Antigen (PSA) Test: About This Test ]. \"  Revisado: 19 abram, 2018  Versión del contenido: 12.1  © 4321-5756 Healthwise, Incorporated. Las instrucciones de cuidado fueron adaptadas bajo licencia por [employer] (which disclaims liability or warranty for this information). Si usted tiene [place] afección médica o sobre estas instrucciones, siempre pregunte a ferreira profesional de garima. Healthwise, Incorporated niega toda garantía o responsabilidad por ferreira uso de esta información.

## 2019-09-13 ENCOUNTER — OFFICE VISIT (OUTPATIENT)
Dept: FAMILY MEDICINE CLINIC | Age: 49
End: 2019-09-13

## 2019-09-13 VITALS
BODY MASS INDEX: 29.98 KG/M2 | RESPIRATION RATE: 16 BRPM | HEART RATE: 68 BPM | WEIGHT: 226.2 LBS | HEIGHT: 73 IN | SYSTOLIC BLOOD PRESSURE: 124 MMHG | OXYGEN SATURATION: 97 % | TEMPERATURE: 97.8 F | DIASTOLIC BLOOD PRESSURE: 88 MMHG

## 2019-09-13 DIAGNOSIS — Z00.00 ROUTINE ADULT HEALTH MAINTENANCE: Primary | ICD-10-CM

## 2019-09-13 DIAGNOSIS — E55.9 VITAMIN D DEFICIENCY: ICD-10-CM

## 2019-09-13 DIAGNOSIS — E66.3 OVERWEIGHT (BMI 25.0-29.9): ICD-10-CM

## 2019-09-13 DIAGNOSIS — Z78.9 USES SPANISH AS PRIMARY SPOKEN LANGUAGE: ICD-10-CM

## 2019-09-13 DIAGNOSIS — Z80.0 FAMILY HISTORY OF COLON CANCER IN MOTHER: ICD-10-CM

## 2019-09-13 DIAGNOSIS — Z28.21 INFLUENZA VACCINATION DECLINED: ICD-10-CM

## 2019-09-13 DIAGNOSIS — Z80.42 FAMILY HISTORY OF PROSTATE CANCER IN FATHER: ICD-10-CM

## 2019-09-13 LAB
25(OH)D3 SERPL-MCNC: 34.2 NG/ML (ref 32–100)
A-G RATIO,AGRAT: 1.8 RATIO (ref 1.1–2.6)
ABSOLUTE LYMPHOCYTE COUNT, 10803: 1.6 K/UL (ref 1–4.8)
ALBUMIN SERPL-MCNC: 4.4 G/DL (ref 3.5–5)
ALP SERPL-CCNC: 76 U/L (ref 25–115)
ALT SERPL-CCNC: 34 U/L (ref 5–40)
ANION GAP SERPL CALC-SCNC: 14 MMOL/L
AST SERPL W P-5'-P-CCNC: 21 U/L (ref 10–37)
AVG GLU, 10930: 98 MG/DL (ref 91–123)
BASOPHILS # BLD: 0.1 K/UL (ref 0–0.2)
BASOPHILS NFR BLD: 1 % (ref 0–2)
BILIRUB SERPL-MCNC: 0.5 MG/DL (ref 0.2–1.2)
BUN SERPL-MCNC: 18 MG/DL (ref 6–22)
CALCIUM SERPL-MCNC: 9.8 MG/DL (ref 8.4–10.4)
CHLORIDE SERPL-SCNC: 103 MMOL/L (ref 98–110)
CHOLEST SERPL-MCNC: 185 MG/DL (ref 110–200)
CO2 SERPL-SCNC: 25 MMOL/L (ref 20–32)
CREAT SERPL-MCNC: 1 MG/DL (ref 0.5–1.2)
EOSINOPHIL # BLD: 0.1 K/UL (ref 0–0.5)
EOSINOPHIL NFR BLD: 2 % (ref 0–6)
ERYTHROCYTE [DISTWIDTH] IN BLOOD BY AUTOMATED COUNT: 13.3 % (ref 10–15.5)
GFRAA, 66117: >60
GFRNA, 66118: >60
GLOBULIN,GLOB: 2.4 G/DL (ref 2–4)
GLUCOSE SERPL-MCNC: 90 MG/DL (ref 70–99)
GRANULOCYTES,GRANS: 55 % (ref 40–75)
HBA1C MFR BLD HPLC: 5 % (ref 4.8–5.9)
HCT VFR BLD AUTO: 48.1 % (ref 39.3–51.6)
HDLC SERPL-MCNC: 39 MG/DL (ref 40–59)
HDLC SERPL-MCNC: 4.7 MG/DL (ref 0–5)
HGB BLD-MCNC: 16.1 G/DL (ref 13.1–17.2)
LDLC SERPL CALC-MCNC: 118 MG/DL (ref 50–99)
LYMPHOCYTES, LYMLT: 34 % (ref 20–45)
MCH RBC QN AUTO: 29 PG (ref 26–34)
MCHC RBC AUTO-ENTMCNC: 34 G/DL (ref 31–36)
MCV RBC AUTO: 88 FL (ref 80–95)
MONOCYTES # BLD: 0.4 K/UL (ref 0.1–1)
MONOCYTES NFR BLD: 8 % (ref 3–12)
NEUTROPHILS # BLD AUTO: 2.5 K/UL (ref 1.8–7.7)
PLATELET # BLD AUTO: 256 K/UL (ref 140–440)
PMV BLD AUTO: 10 FL (ref 9–13)
POTASSIUM SERPL-SCNC: 4.5 MMOL/L (ref 3.5–5.5)
PROT SERPL-MCNC: 6.8 G/DL (ref 6.4–8.3)
PSA SERPL-MCNC: 1.18 NG/ML
RBC # BLD AUTO: 5.47 M/UL (ref 3.8–5.8)
SODIUM SERPL-SCNC: 142 MMOL/L (ref 133–145)
T4 FREE SERPL-MCNC: 1.5 NG/DL (ref 0.9–1.8)
TRIGL SERPL-MCNC: 143 MG/DL (ref 40–149)
TSH SERPL DL<=0.005 MIU/L-ACNC: 1.06 MCU/ML (ref 0.27–4.2)
VLDLC SERPL CALC-MCNC: 29 MG/DL (ref 8–30)
WBC # BLD AUTO: 4.6 K/UL (ref 4–11)

## 2019-09-13 NOTE — PROGRESS NOTES
Isaac Elliott is a 51 y.o. male (: 1970) presenting to address:    Chief Complaint   Patient presents with    Complete Physical    Nasal Congestion     x 1 week    Sore Throat     Patient DECLINED FLU vaccine. Vitals:    19 0716   BP: 123/90   Pulse: 68   Resp: 16   Temp: 97.8 °F (36.6 °C)   TempSrc: Oral   SpO2: 97%   Weight: 226 lb 3.2 oz (102.6 kg)   Height: 6' 1\" (1.854 m)   PainSc:   0 - No pain       Hearing/Vision:   No exam data present    Learning Assessment:     Learning Assessment 2017   PRIMARY LEARNER Patient   HIGHEST LEVEL OF EDUCATION - PRIMARY LEARNER  > 4 YEARS OF COLLEGE   BARRIERS PRIMARY LEARNER LANGUAGE   CO-LEARNER CAREGIVER Yes   CO-LEARNER NAME wife   CO-LEARNER HIGHEST LEVEL OF EDUCATION > 4 YEARS OF COLLEGE   BARRIERS CO-LEARNER LANGUAGE   PRIMARY LANGUAGE Citizen of [place]   PRIMARY LANGUAGE CO-LEARNER Citizen of [place]    NEED No   LEARNER PREFERENCE PRIMARY READING   LEARNER PREFERENCE CO-LEARNER VIDEOS   LEARNING SPECIAL TOPICS no   ANSWERED BY self   RELATIONSHIP SELF     Depression Screening:     3 most recent PHQ Screens 2019   Little interest or pleasure in doing things Not at all   Feeling down, depressed, irritable, or hopeless Not at all   Total Score PHQ 2 0     Fall Risk Assessment:     Fall Risk Assessment, last 12 mths 2017   Able to walk? Yes   Fall in past 12 months? No     Abuse Screening:     Abuse Screening Questionnaire 2017   Do you ever feel afraid of your partner? N   Are you in a relationship with someone who physically or mentally threatens you? N   Is it safe for you to go home? Y     Coordination of Care Questionaire:   1. Have you been to the ER, urgent care clinic since your last visit? Hospitalized since your last visit? NO    2. Have you seen or consulted any other health care providers outside of the [address] [place] [address] since your last visit? Include any pap smears or colon screening.  NO    Advanced Directive:   1. Do you have an Advanced Directive? NO    2. Would you like information on Advanced Directives?  NO

## 2019-09-15 PROBLEM — Z80.0 FAMILY HISTORY OF COLON CANCER IN MOTHER: Status: ACTIVE | Noted: 2019-09-15

## 2021-02-08 ENCOUNTER — OFFICE VISIT (OUTPATIENT)
Dept: FAMILY MEDICINE CLINIC | Age: 51
End: 2021-02-08
Payer: COMMERCIAL

## 2021-02-08 VITALS
HEIGHT: 73 IN | RESPIRATION RATE: 16 BRPM | BODY MASS INDEX: 31.14 KG/M2 | OXYGEN SATURATION: 99 % | DIASTOLIC BLOOD PRESSURE: 78 MMHG | WEIGHT: 235 LBS | SYSTOLIC BLOOD PRESSURE: 124 MMHG | HEART RATE: 74 BPM | TEMPERATURE: 97.9 F

## 2021-02-08 DIAGNOSIS — G93.31 POST VIRAL SYNDROME: Primary | ICD-10-CM

## 2021-02-08 DIAGNOSIS — Z00.00 ROUTINE ADULT HEALTH MAINTENANCE: ICD-10-CM

## 2021-02-08 DIAGNOSIS — Z78.9 USES SPANISH AS PRIMARY SPOKEN LANGUAGE: ICD-10-CM

## 2021-02-08 DIAGNOSIS — K63.5 POLYP OF COLON, UNSPECIFIED PART OF COLON, UNSPECIFIED TYPE: ICD-10-CM

## 2021-02-08 DIAGNOSIS — M79.10 MYALGIA: ICD-10-CM

## 2021-02-08 DIAGNOSIS — E55.9 VITAMIN D DEFICIENCY: ICD-10-CM

## 2021-02-08 PROCEDURE — 99215 OFFICE O/P EST HI 40 MIN: CPT | Performed by: FAMILY MEDICINE

## 2021-02-08 RX ORDER — LORATADINE 10 MG/1
10 TABLET ORAL
COMMUNITY

## 2021-02-08 NOTE — PROGRESS NOTES
Note to patient:  The purpose of this note is to communicate optimally with the other physicians / APCs involved in your care. It is written using standard medical terminology. If you have questions regarding the details of the note, please contact my office to schedule an appointment to address your questions. [address] [facility]  Primary Care Office Visit - Problem-Oriented    : 1970   Isaac Oviedo is a 45 y.o. male presenting for  Chief Complaint   Patient presents with    Fatigue            Assessment/Plan:         ICD-10-CM ICD-9-CM   1. Post viral syndrome  G93.3 780.79   2. Polyp of colon, unspecified part of colon, unspecified type  K63.5 211.3   3. Vitamin D deficiency  E55.9 268.9   4. Uses Dominican as primary spoken language  Z78.9 V40.1   5. Routine adult health maintenance  Z00.00 V70.0       # post-viral syndrome  Sx onset 2021  Negative COVID test x2  Notes feeling 70% better  > educated re: sen/spec of COVID testing as well as role of antibody testing  > check antibody status      # colon polyp  With  # FHx colon cancer  Followed by GI (Dr. Kory Connors), reports recent c-scope.  > requesting copy of c-scope and recommended interval      # VitD def  Unclear control   > labs      # HM  > labs    [ entire visit conducted in [address] [facility] ]           On this date 2021, I have spent 42 minutes providing care to this patient, which included reviewing the EMR to see if there were any recent visits to the ED, specialists, prior lab or radiology results, obtaining the history from the patient, examining the patient, providing discharge education regarding the diagnosis and counseling on appropriate follow-up, as well as documenting this visit in the EMR. This document may have been created with the aid of dictation software. Text may contain errors, particularly phonetic errors. Reviewed management plan & instructions with patient, who voiced understanding. Skip Wagoner MD  Internal Medicine, Family Medicine & Sports Medicine  2/8/2021    Subjective   History:   Isaac Kidd is a 45 y.o. male presenting to address:  Chief Complaint   Patient presents with    Fatigue       Thinks he may have had COVID, had profound muscle soreness, weakness, chronic headache. Decreased PO intake. No congestion. Never lost taste or smell. Negative COVID testing x 2. Sx started 1/22/2021. Now, still feels somewhat fatigued. Has some calf muscle soreness. Feels 70% improved. Reports c-scope in 2020 with removal of 1-2 polyps. Known FHx colon ca. Reports call back is 5 years (2025). Past Medical History:   Diagnosis Date    Acid reflux      Past Surgical History:   Procedure Laterality Date    HX HEMORRHOIDECTOMY  2005    HX HERNIA REPAIR Right 1982    HX VASECTOMY        reports that he has been smoking cigars. He has never used smokeless tobacco. He reports current alcohol use. He reports that he does not use drugs. Social History     Social History Narrative    Originally from [place]. 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     Lived in the [address] [place] [address] since 2011.    (8/2017)     Social History     Tobacco Use   Smoking Status Current Some Day Smoker    Types: Cigars   Smokeless Tobacco Never Used     Family History   Problem Relation Age of Onset    Cancer Mother 58        colon   [facility] Cancer Father 72        prostate     No Known Allergies    Problem List:      Patient Active Problem List    Diagnosis    Family history of colon cancer in mother     Colonoscopy will be due in 2020      History of BCG vaccination    Family history of prostate cancer in father    Overweight    Vitamin D deficiency    Poor sleep    Fatigue    Blepharospasm    Onychomycosis    Gastroesophageal reflux disease    Paresthesia of both hands    Uses [place] as primary spoken language       Medications:     Current Outpatient Medications   Medication Sig    loratadine (CLARITIN) 10 mg tablet Take 10 mg by mouth daily as needed for Allergies.  chlorphen/pseudoeph/ibuprofen (ADVIL MULTI-SYMPTOM COLD PO) Take  by mouth.  ciclopirox (PENLAC) 8 % solution APPLY TO ADJACENT SKIN AND AFFECTED NAILS DAILY. REMOVE WITH ALCOHOL EVERY 7 DAYS. No current facility-administered medications for this visit. Review of Systems:     Review of Systems   Constitutional: Positive for fatigue. Musculoskeletal: Positive for myalgias. Neurological: Positive for headaches. Objective   Physical Assessment:   VS:    Vitals:    02/08/21 1343   BP: 124/78   Pulse: 74   Resp: 16   Temp: 97.9 °F (36.6 °C)   TempSrc: Temporal   SpO2: 99%   Weight: 235 lb (106.6 kg)   Height: 6' 1\" (1.854 m)   PainSc:   0 - No pain       Physical Exam  Nursing note reviewed. Constitutional:       General: He is not in acute distress. Appearance: He is well-developed. He is not diaphoretic. HENT:      Head: Normocephalic and atraumatic. Right Ear: Tympanic membrane, ear canal and external ear normal.      Left Ear: Tympanic membrane, ear canal and external ear normal.      Nose:      Comments: Mask in place  Eyes:      Pupils: Pupils are equal, round, and reactive to light. Neck:      Musculoskeletal: Normal range of motion and neck supple. Cardiovascular:      Rate and Rhythm: Normal rate and regular rhythm. Heart sounds: Normal heart sounds. No murmur. Pulmonary:      Effort: Pulmonary effort is normal. No respiratory distress. Breath sounds: Normal breath sounds. No wheezing. Musculoskeletal: Normal range of motion. Skin:     General: Skin is warm and dry. Neurological:      Mental Status: He is alert and oriented to person, place, and time. Cranial Nerves: No cranial nerve deficit. Psychiatric:         Behavior: Behavior normal.         Thought Content:  Thought content normal.         Judgment: Judgment normal.

## 2021-02-08 NOTE — PROGRESS NOTES
Isaac Bolivar is a 45 y.o. male (: 1970) presenting to address:    Chief Complaint   Patient presents with    Fatigue     Patient DECLINED FLU & Shingrix vaccine. Vitals:    21 1343   BP: 124/78   Pulse: 74   Resp: 16   Temp: 97.9 °F (36.6 °C)   TempSrc: Temporal   SpO2: 99%   Weight: 235 lb (106.6 kg)   Height: 6' 1\" (1.854 m)   PainSc:   0 - No pain       Hearing/Vision:   No exam data present    Learning Assessment:     Learning Assessment 2017   PRIMARY LEARNER Patient   HIGHEST LEVEL OF EDUCATION - PRIMARY LEARNER  > 4 YEARS OF COLLEGE   BARRIERS PRIMARY LEARNER LANGUAGE   CO-LEARNER CAREGIVER Yes   CO-LEARNER NAME wife   CO-LEARNER HIGHEST LEVEL OF EDUCATION > 4 YEARS OF COLLEGE   BARRIERS CO-LEARNER LANGUAGE   PRIMARY LANGUAGE [place]ese   PRIMARY LANGUAGE CO-LEARNER [place]ese    NEED No   LEARNER PREFERENCE PRIMARY READING   LEARNER PREFERENCE CO-LEARNER VIDEOS   LEARNING SPECIAL TOPICS no   ANSWERED BY self   RELATIONSHIP SELF     Depression Screening:     3 most recent PHQ Screens 2021   Little interest or pleasure in doing things Not at all   Feeling down, depressed, irritable, or hopeless Not at all   Total Score PHQ 2 0     Fall Risk Assessment:     Fall Risk Assessment, last 12 mths 2017   Able to walk? Yes   Fall in past 12 months? No     Abuse Screening:     Abuse Screening Questionnaire 2017   Do you ever feel afraid of your partner? N   Are you in a relationship with someone who physically or mentally threatens you? N   Is it safe for you to go home? Y     Coordination of Care Questionaire:   1. Have you been to the ER, urgent care clinic since your last visit? Hospitalized since your last visit? [employer] COVID TEST NEGATIVE    2. Have you seen or consulted any other health care providers outside of the [address] [place] [address] since your last visit? Include any pap smears or colon screening. NO    Advanced Directive:   1.  Do you have an Advanced Directive? NO    2. Would you like information on Advanced Directives?  NO

## 2021-02-09 ENCOUNTER — APPOINTMENT (OUTPATIENT)
Dept: FAMILY MEDICINE CLINIC | Age: 51
End: 2021-02-09

## 2021-02-09 DIAGNOSIS — E55.9 VITAMIN D DEFICIENCY: ICD-10-CM

## 2021-02-09 DIAGNOSIS — G93.31 POST VIRAL SYNDROME: ICD-10-CM

## 2021-02-09 DIAGNOSIS — Z00.00 ROUTINE ADULT HEALTH MAINTENANCE: ICD-10-CM

## 2021-02-09 DIAGNOSIS — M79.10 MYALGIA: ICD-10-CM

## 2021-02-10 LAB
25(OH)D3 SERPL-MCNC: 28.2 NG/ML (ref 32–100)
A-G RATIO,AGRAT: 2.3 RATIO (ref 1.1–2.6)
ABSOLUTE LYMPHOCYTE COUNT, 10803: 2 K/UL (ref 1–4.8)
ALBUMIN SERPL-MCNC: 4.5 G/DL (ref 3.5–5)
ALP SERPL-CCNC: 74 U/L (ref 25–115)
ALT SERPL-CCNC: 28 U/L (ref 5–40)
ANION GAP SERPL CALC-SCNC: 11.3 MMOL/L (ref 3–15)
AST SERPL W P-5'-P-CCNC: 17 U/L (ref 10–37)
AVG GLU, 10930: 103 MG/DL (ref 91–123)
BASOPHILS # BLD: 0 K/UL (ref 0–0.2)
BASOPHILS NFR BLD: 1 % (ref 0–2)
BILIRUB SERPL-MCNC: 0.5 MG/DL (ref 0.2–1.2)
BUN SERPL-MCNC: 14 MG/DL (ref 6–22)
CALCIUM SERPL-MCNC: 9.6 MG/DL (ref 8.4–10.5)
CHLORIDE SERPL-SCNC: 104 MMOL/L (ref 98–110)
CHOLEST SERPL-MCNC: 197 MG/DL (ref 110–200)
CK SERPL-CCNC: 259 U/L (ref 30–200)
CO2 SERPL-SCNC: 27 MMOL/L (ref 20–32)
CREAT SERPL-MCNC: 0.9 MG/DL (ref 0.5–1.2)
EOSINOPHIL # BLD: 0.1 K/UL (ref 0–0.5)
EOSINOPHIL NFR BLD: 2 % (ref 0–6)
ERYTHROCYTE [DISTWIDTH] IN BLOOD BY AUTOMATED COUNT: 13.8 % (ref 10–15.5)
GFRAA, 66117: >60
GFRNA, 66118: >60
GLOBULIN,GLOB: 2 G/DL (ref 2–4)
GLUCOSE SERPL-MCNC: 96 MG/DL (ref 70–99)
GRANULOCYTES,GRANS: 49 % (ref 40–75)
HBA1C MFR BLD HPLC: 5.2 % (ref 4.8–5.6)
HCT VFR BLD AUTO: 47.5 % (ref 39.3–51.6)
HDLC SERPL-MCNC: 39 MG/DL
HDLC SERPL-MCNC: 5.1 MG/DL (ref 0–5)
HGB BLD-MCNC: 15 G/DL (ref 13.1–17.2)
LDL/HDL RATIO,LDHD: 3.2
LDLC SERPL CALC-MCNC: 125 MG/DL (ref 50–99)
LYMPHOCYTES, LYMLT: 40 % (ref 20–45)
MCH RBC QN AUTO: 29 PG (ref 26–34)
MCHC RBC AUTO-ENTMCNC: 32 G/DL (ref 31–36)
MCV RBC AUTO: 92 FL (ref 80–95)
MONOCYTES # BLD: 0.4 K/UL (ref 0.1–1)
MONOCYTES NFR BLD: 8 % (ref 3–12)
NEUTROPHILS # BLD AUTO: 2.4 K/UL (ref 1.8–7.7)
NON-HDL CHOLESTEROL, 011976: 158 MG/DL
PLATELET # BLD AUTO: 254 K/UL (ref 140–440)
PMV BLD AUTO: 10.4 FL (ref 9–13)
POTASSIUM SERPL-SCNC: 4.2 MMOL/L (ref 3.5–5.5)
PROT SERPL-MCNC: 6.5 G/DL (ref 6.4–8.3)
PSA SERPL-MCNC: 1.63 NG/ML
RBC # BLD AUTO: 5.17 M/UL (ref 3.8–5.8)
SARS-COV-2 AB: NON REACTIVE
SODIUM SERPL-SCNC: 142 MMOL/L (ref 133–145)
T4 FREE SERPL-MCNC: 1.3 NG/DL (ref 0.9–1.8)
TRIGL SERPL-MCNC: 164 MG/DL (ref 40–149)
TSH SERPL DL<=0.005 MIU/L-ACNC: 1.07 MCU/ML (ref 0.27–4.2)
VLDLC SERPL CALC-MCNC: 33 MG/DL (ref 8–30)
WBC # BLD AUTO: 4.9 K/UL (ref 4–11)

## 2021-04-13 ENCOUNTER — OFFICE VISIT (OUTPATIENT)
Dept: FAMILY MEDICINE CLINIC | Age: 51
End: 2021-04-13
Payer: COMMERCIAL

## 2021-04-13 VITALS
RESPIRATION RATE: 16 BRPM | DIASTOLIC BLOOD PRESSURE: 75 MMHG | HEART RATE: 73 BPM | BODY MASS INDEX: 31.09 KG/M2 | SYSTOLIC BLOOD PRESSURE: 128 MMHG | OXYGEN SATURATION: 97 % | HEIGHT: 73 IN | TEMPERATURE: 97.7 F | WEIGHT: 234.6 LBS

## 2021-04-13 DIAGNOSIS — R40.0 DAYTIME SLEEPINESS: ICD-10-CM

## 2021-04-13 DIAGNOSIS — E78.5 ELEVATED LIPIDS: ICD-10-CM

## 2021-04-13 DIAGNOSIS — R06.83 SNORING: ICD-10-CM

## 2021-04-13 DIAGNOSIS — Z78.9 USES SPANISH AS PRIMARY SPOKEN LANGUAGE: ICD-10-CM

## 2021-04-13 DIAGNOSIS — Z00.00 ROUTINE ADULT HEALTH MAINTENANCE: Primary | ICD-10-CM

## 2021-04-13 DIAGNOSIS — R74.8 ELEVATED CK: ICD-10-CM

## 2021-04-13 DIAGNOSIS — E66.09 CLASS 1 OBESITY DUE TO EXCESS CALORIES WITHOUT SERIOUS COMORBIDITY WITH BODY MASS INDEX (BMI) OF 30.0 TO 30.9 IN ADULT: ICD-10-CM

## 2021-04-13 DIAGNOSIS — R53.83 FATIGUE, UNSPECIFIED TYPE: ICD-10-CM

## 2021-04-13 PROCEDURE — 99396 PREV VISIT EST AGE 40-64: CPT | Performed by: FAMILY MEDICINE

## 2021-04-13 NOTE — PATIENT INSTRUCTIONS
- continue to work on healthy lifestyle, weight loss    - increase daily amount of over-the-counter Vitamin D3    - recommend rechecking cholesterol in fall / winter 2021 timeframe    - The sleep medicine office should be calling you to schedule in the next 1-2 weeks. If you don't hear from them after 2 weeks, call their office directly to check on the status of your referral.            Dr. Mauricio Crenshaw no longer practicing Primary Care effective July 1st, 2021       Due to increasing family obligations, I will no longer be practicing as a Primary Care physician effective July 1, 2021. Leading up to July 1st, I will work collaboratively with my current primary care colleagues at [address] [place] [address]) to facilitate this transition for my primary care patients. If you have not been contacted about any appointments scheduled for after July 1, 2021, please assist [facility] in optimizing the continuity of your care by contacting the practice for appropriate and timely rescheduling. Dr. Mauricio Crenshaw will continue with [employer] as a Sports Medicine Specialist        After July 1st, I will continue to serve the [facility] as a Sports Medicine Physician. My sports medicine practice will offer expertise in the non-operative treatment of acute and chronic musculoskeletal conditions, as well as non-musculoskeletal aspects of sports medicine for patients 12 years and older. Common examples of non-musculoskeletal sports medicine include: concussion (mild traumatic brain injury), exercise prescription, injury prevention, and return to [facility] decisions for the , the [employer] and the [employer]. I hope that you and your loved ones will keep me in mind for your sports medicine needs.     TESTING RESULTS       If you have MyChart access:   Per federal regulations as of October 20th, 2020, all of your results will be released to you and to your physician / provider simultaneously on [employer].    o This means that it is likely that you will have the opportunity to review your results before your physician / provider!  Since all \"critical\" abnormal results are immediately called to the office / on-call providers on nights, weekends and holidays - please refrain from calling after hours when you receive abnormal results through [address] Instead, allow time for your physician / provider to review your results and then provide interpretation and/or guidance.  If the results are significantly abnormal and require time-sensitive action - guidance will be provided both via [employer] and via telephone.  For all other results (interpreted as \"normal\", \"abnormal but expected\", \"reassuring / stable\", \"slightly abnormal\") - non-urgent guidance will be provided via [employer] communication only. Luis  #[phone]      If you do NOT have [employer] access:   If the results are significantly abnormal and require time-sensitive action - guidance will be relayed to you via telephone.  For all other results (interpreted as \"normal\", \"abnormal but expected\", \"reassuring / stable\", \"slightly abnormal\") - non-urgent guidance will be mailed to you via [employer]      Results from Outside Facilities / Laboratories:  Results of tests performed at outside facilities / laboratories likely will not appear in the [employer].  o They may appear in the patient portals of those outside facilities / laboratories. o Please keep in mind that with your access to your patient portal directly to an outside facility / laboratory, you are likely viewing results before your physician / provider! Please allow time for your physician / provider to review your results and then provide interpretation and/or guidance.       If you have questions about your results beyond the guidance provided in [employer] or in your results letter, please schedule a follow up appointment to discuss with your physician / provider. MEDICATION REFILLS         Please request medication refills through your pharmacy, to ensure the correct pharmacy is used.  Please allow at least 2 business days for refill requests to be addressed.  Refills will not be provided by the after hours/on call provider.

## 2021-04-13 NOTE — PROGRESS NOTES
Note to patient:  The purpose of this note is to communicate optimally with the other physicians / APCs involved in your care. It is written using standard medical terminology. If you have questions regarding the details of the note, please contact my office to schedule an appointment to address your questions. [address]   Primary Care Office Visit - Complete Physical    Isaac Acuna is a 45 y.o. male presenting for annual physical.  : 1970        Assessment/Plan:       ICD-10-CM ICD-9-CM   1. Routine adult health maintenance  Z00.00 V70.0   2. Class 1 obesity due to excess calories without serious comorbidity with body mass index (BMI) of 30.0 to 30.9 in adult  E66.09 278.00    Z68.30 V85.30   3. Daytime sleepiness  R40.0 780.54   4. Fatigue, unspecified type  R53.83 780.79   5. Snoring  R06.83 786.09   6. Elevated CK  R74.8 790.5   7. Uses Northern Irish as primary spoken language  Z78.9 V40.1         # HM  > UTD on  items      # obesity - working on wt loss  > positive reinforcement given      # daytime sleepiness - ongoing  With  # fatigue  And   # snoring  At this time, amenable to referral  > ref to sleep med      # VitD def - ongoing  > inc over-the-counter VitD3       # elevated CK - in 2021  Likely 2/2 recent viral illness   > recheck    # elevated lipids - currently working on wt loss  > recommend recheck in fall/winter     [ entire visit conducted in [place] ]       Orders Placed This Encounter    CK     Standing Status:   Future     Number of Occurrences:   1     Standing Expiration Date:   2022    REFERRAL TO SLEEP STUDIES     Referral Priority:   Routine     Referral Type:   Consultation     Referral Reason:   Specialty Services Required     Referred to Provider:   Jorge Diaz MD     Number of Visits Requested:   1         Management plan & patient instructions reviewed with Isaac Acuna, who voiced understanding.     This document may have been created with the aid of dictation software. Text may contain errors, particularly phonetic errors. Anatoly Mcmahon MD  Internal Medicine, Family Medicine & Sports Medicine  4/13/2021         Subjective   History:   Isaac Anglin is a 45 y.o. male presenting for an annual physical.    Last OV: 2/8/2021      Overall doing okay. Still always fatigued. Working on losing wt. Noted his elevated cholesterol - would prefer to not add statin at this time. Past Medical History:   Diagnosis Date    Acid reflux      Past Surgical History:   Procedure Laterality Date    HX HEMORRHOIDECTOMY  2005    HX HERNIA REPAIR Right 1982    HX VASECTOMY        reports that he has been smoking cigars. He has never used smokeless tobacco. He reports current alcohol use. He reports that he does not use drugs. Social History     Social History Narrative    Originally from [facility]. [employer]. Lived in the [address] [place] [address] since 2011.    (8/2017)     Social History     Tobacco Use   Smoking Status Current Some Day Smoker    Types: Cigars   Smokeless Tobacco Never Used     Family History   Problem Relation Age of Onset    Cancer Mother 58        colon   [place] Cancer Father 72        prostate     No Known Allergies    Problem List:      Patient Active Problem List    Diagnosis    Family history of colon cancer in mother     Colonoscopy will be due in 2020      History of BCG vaccination    Family history of prostate cancer in father    Overweight    Vitamin D deficiency    Poor sleep    Fatigue    Blepharospasm    Onychomycosis    Gastroesophageal reflux disease    Paresthesia of both hands    Uses Nigerian as primary spoken language       Medications:     Current Outpatient Medications   Medication Sig    loratadine (CLARITIN) 10 mg tablet Take 10 mg by mouth daily as needed for Allergies. No current facility-administered medications for this visit.         Review of Systems:     Review of Systems         Objective   Physical Assessment:   VS:    Visit Vitals  /75 (BP 1 Location: Left upper arm, BP Patient Position: Sitting, BP Cuff Size: Large adult)   Pulse 73   Temp 97.7 °F (36.5 °C) (Temporal)   Resp 16   Ht 6' 1\" (1.854 m)   Wt 234 lb 9.6 oz (106.4 kg)   SpO2 97%   BMI 30.95 kg/m²       Physical Exam  Nursing note reviewed. Constitutional:       General: He is not in acute distress. Appearance: He is well-developed. He is not diaphoretic. HENT:      Head: Normocephalic and atraumatic. Right Ear: Tympanic membrane, ear canal and external ear normal.      Left Ear: Tympanic membrane, ear canal and external ear normal.      Nose:      Comments: Mask in place  Eyes:      Pupils: Pupils are equal, round, and reactive to light. Neck:      Musculoskeletal: Normal range of motion and neck supple. Cardiovascular:      Rate and Rhythm: Normal rate and regular rhythm. Heart sounds: Normal heart sounds. No murmur. Pulmonary:      Effort: Pulmonary effort is normal. No respiratory distress. Breath sounds: Normal breath sounds. No wheezing. Musculoskeletal: Normal range of motion. Skin:     General: Skin is warm and dry. Neurological:      Mental Status: He is alert and oriented to person, place, and time. Cranial Nerves: No cranial nerve deficit. Psychiatric:         Behavior: Behavior normal.         Thought Content:  Thought content normal.         Judgment: Judgment normal.           Recent Labs & Imaging:       Lab Results   Component Value Date/Time    WBC 4.9 02/09/2021 08:37 AM    HGB 15.0 02/09/2021 08:37 AM    HCT 47.5 02/09/2021 08:37 AM    PLATELET 347 80/54/0526 08:37 AM    MCV 92 02/09/2021 08:37 AM       Lab Results   Component Value Date/Time    Sodium 142 02/09/2021 08:37 AM    Potassium 4.2 02/09/2021 08:37 AM    Chloride 104 02/09/2021 08:37 AM    CO2 27 02/09/2021 08:37 AM    Anion gap 11.3 02/09/2021 08:37 AM Glucose 96 02/09/2021 08:37 AM    BUN 14 02/09/2021 08:37 AM    Creatinine 0.9 02/09/2021 08:37 AM    Calcium 9.6 02/09/2021 08:37 AM    Bilirubin, total 0.5 02/09/2021 08:37 AM    Alk.  phosphatase 74 02/09/2021 08:37 AM    Protein, total 6.5 02/09/2021 08:37 AM    Albumin 4.5 02/09/2021 08:37 AM    Globulin 2.0 02/09/2021 08:37 AM    A-G Ratio 2.3 02/09/2021 08:37 AM    ALT (SGPT) 28 02/09/2021 08:37 AM       Lab Results   Component Value Date/Time    Cholesterol, total 197 02/09/2021 08:37 AM    HDL Cholesterol 39 (L) 02/09/2021 08:37 AM    LDL, calculated 125 (H) 02/09/2021 08:37 AM    VLDL, calculated 33 (H) 02/09/2021 08:37 AM    Triglyceride 164 (H) 02/09/2021 08:37 AM       Lab Results   Component Value Date/Time    Hemoglobin A1c 5.2 02/09/2021 08:37 AM       Lab Results   Component Value Date/Time    TSH 1.07 02/09/2021 08:37 AM    T4, Free 1.3 02/09/2021 08:37 AM       Lab Results   Component Value Date/Time    VITAMIN D, 25-HYDROXY 28.2 (L) 02/09/2021 08:37 AM

## 2021-04-13 NOTE — Clinical Note
Pls call Pat's office to ask for last office note / last colonoscopy (if it was more recent than 2015). Thanks!

## 2021-04-13 NOTE — PROGRESS NOTES
Isaac Bermudez is a 45 y.o. male (: 1970) presenting to address:    Chief Complaint   Patient presents with    Complete Physical       Vitals:    21 1319   BP: 128/75   Pulse: 73   Resp: 16   Temp: 97.7 °F (36.5 °C)   TempSrc: Temporal   SpO2: 97%   Weight: 234 lb 9.6 oz (106.4 kg)   Height: 6' 1\" (1.854 m)   PainSc:   0 - No pain     Patient 1st COVID Vaccine was 3/24/21 and 2nd dose is scheduled for 21 so patient is unable to recieve Pneumo 23 and TDAP. Hearing/Vision:   No exam data present    Learning Assessment:     Learning Assessment 2017   PRIMARY LEARNER Patient   HIGHEST LEVEL OF EDUCATION - PRIMARY LEARNER  > 4 YEARS OF COLLEGE   BARRIERS PRIMARY LEARNER LANGUAGE   CO-LEARNER CAREGIVER Yes   CO-LEARNER NAME wife   CO-LEARNER HIGHEST LEVEL OF EDUCATION > 4 YEARS OF COLLEGE   BARRIERS CO-LEARNER LANGUAGE   PRIMARY LANGUAGE Albanian   PRIMARY LANGUAGE CO-LEARNER Albanian    NEED No   LEARNER PREFERENCE PRIMARY READING   LEARNER PREFERENCE CO-LEARNER VIDEOS   LEARNING SPECIAL TOPICS no   ANSWERED BY self   RELATIONSHIP SELF     Depression Screening:     3 most recent PHQ Screens 2021   Little interest or pleasure in doing things Not at all   Feeling down, depressed, irritable, or hopeless Not at all   Total Score PHQ 2 0     Fall Risk Assessment:     Fall Risk Assessment, last 12 mths 2017   Able to walk? Yes   Fall in past 12 months? No     Abuse Screening:     Abuse Screening Questionnaire 2017   Do you ever feel afraid of your partner? N   Are you in a relationship with someone who physically or mentally threatens you? N   Is it safe for you to go home? Y     Coordination of Care Questionaire:   1. Have you been to the ER, urgent care clinic since your last visit? Hospitalized since your last visit? NO    2. Have you seen or consulted any other health care providers outside of the [address] [place] [address] since your last visit?   Include any pap smears or colon screening. NO    Advanced Directive:   1. Do you have an Advanced Directive? NO    2. Would you like information on Advanced Directives?  NO

## 2021-04-14 ENCOUNTER — APPOINTMENT (OUTPATIENT)
Dept: FAMILY MEDICINE CLINIC | Age: 51
End: 2021-04-14

## 2021-04-14 DIAGNOSIS — R74.8 ELEVATED CK: ICD-10-CM

## 2021-04-15 LAB — CK SERPL-CCNC: 146 U/L (ref 30–200)

## 2023-10-27 NOTE — PATIENT INSTRUCTIONS
Instrucciones:  · empezar ferreira Vitamina D, y despues de los 3 meses, empezar 2000 units de Vitamin D3 cada chris para mantener ferreira nivel  · regresar en Lunes para ferreira tuberculosis skin test  · regresar en 5 meses para examen de maynor para rechequear ferreira Vitamin D y ferreira colesterol  · \"insight timer\" shanice
27-Oct-2023 13:27